# Patient Record
Sex: FEMALE | Race: WHITE
[De-identification: names, ages, dates, MRNs, and addresses within clinical notes are randomized per-mention and may not be internally consistent; named-entity substitution may affect disease eponyms.]

---

## 2017-07-22 ENCOUNTER — HOSPITAL ENCOUNTER (EMERGENCY)
Dept: HOSPITAL 61 - ER | Age: 77
Discharge: HOME | End: 2017-07-22
Payer: COMMERCIAL

## 2017-07-22 VITALS
SYSTOLIC BLOOD PRESSURE: 186 MMHG | DIASTOLIC BLOOD PRESSURE: 79 MMHG | SYSTOLIC BLOOD PRESSURE: 186 MMHG | SYSTOLIC BLOOD PRESSURE: 186 MMHG | DIASTOLIC BLOOD PRESSURE: 79 MMHG | DIASTOLIC BLOOD PRESSURE: 79 MMHG

## 2017-07-22 VITALS — HEIGHT: 63 IN | BODY MASS INDEX: 23.04 KG/M2 | WEIGHT: 130 LBS

## 2017-07-22 DIAGNOSIS — I10: ICD-10-CM
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
## 2017-07-22 DIAGNOSIS — S42.92XA: Primary | ICD-10-CM
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
## 2017-07-22 DIAGNOSIS — M54.2: ICD-10-CM

## 2017-07-22 DIAGNOSIS — I48.0: ICD-10-CM

## 2017-07-22 DIAGNOSIS — J44.9: ICD-10-CM

## 2017-07-22 DIAGNOSIS — Z88.8: ICD-10-CM

## 2017-07-22 DIAGNOSIS — S00.91XA: ICD-10-CM

## 2017-07-22 DIAGNOSIS — E11.9: ICD-10-CM

## 2017-07-22 LAB
ANION GAP SERPL CALC-SCNC: 5 MMOL/L (ref 6–14)
BUN SERPL-MCNC: 15 MG/DL (ref 7–20)
CALCIUM SERPL-MCNC: 10 MG/DL (ref 8.5–10.1)
CHLORIDE SERPL-SCNC: 107 MMOL/L (ref 98–107)
CO2 SERPL-SCNC: 32 MMOL/L (ref 21–32)
CREAT SERPL-MCNC: 0.8 MG/DL (ref 0.6–1)
GFR SERPLBLD BASED ON 1.73 SQ M-ARVRAT: 69.6 ML/MIN
GLUCOSE SERPL-MCNC: 169 MG/DL (ref 70–99)
HCT VFR BLD CALC: 38.7 % (ref 36–47)
HGB BLD-MCNC: 12.9 G/DL (ref 12–15.5)
INR PPP: 3.1 (ref 0.8–1.1)
PLATELET # BLD AUTO: 204 X10^3/UL (ref 140–400)
POTASSIUM SERPL-SCNC: 4.2 MMOL/L (ref 3.5–5.1)
PROTHROMBIN TIME: 29.7 SEC (ref 11.7–14)
SODIUM SERPL-SCNC: 144 MMOL/L (ref 136–145)
WBC # BLD AUTO: 8.3 X10^3/UL (ref 4–11)

## 2017-07-22 PROCEDURE — 85610 PROTHROMBIN TIME: CPT

## 2017-07-22 PROCEDURE — 36415 COLL VENOUS BLD VENIPUNCTURE: CPT

## 2017-07-22 PROCEDURE — 85027 COMPLETE CBC AUTOMATED: CPT

## 2017-07-22 PROCEDURE — 70450 CT HEAD/BRAIN W/O DYE: CPT

## 2017-07-22 PROCEDURE — 80048 BASIC METABOLIC PNL TOTAL CA: CPT

## 2017-07-22 PROCEDURE — 96374 THER/PROPH/DIAG INJ IV PUSH: CPT

## 2017-07-22 PROCEDURE — 96375 TX/PRO/DX INJ NEW DRUG ADDON: CPT

## 2017-07-22 PROCEDURE — 73060 X-RAY EXAM OF HUMERUS: CPT

## 2017-07-22 PROCEDURE — 73030 X-RAY EXAM OF SHOULDER: CPT

## 2017-07-22 PROCEDURE — 99285 EMERGENCY DEPT VISIT HI MDM: CPT

## 2017-07-22 PROCEDURE — 72125 CT NECK SPINE W/O DYE: CPT

## 2017-07-22 PROCEDURE — 96376 TX/PRO/DX INJ SAME DRUG ADON: CPT

## 2017-07-22 PROCEDURE — 71010: CPT

## 2017-07-22 NOTE — ED.ADGEN
Past Medical History


Past Medical History:  COPD, Diabetes-Type II, Hypertension


Past Surgical History:  No Surgical History


Alcohol Use:  None


Drug Use:  None





Adult General


Chief Complaint


Chief Complaint:  MECHANICAL FALL





HPI


HPI





Patient is a 76  year old with history of paroxysmal A. fib currently on 

Coumadin, COPD and hypertension who presents with mechanical fall while 

outdoors. Patient fell landing on her left arm and striking her head off of 

steps. Patient states she caught her foot on her steps while attempting to walk 

indoors. Denies loss of consciousness or headache. Patient has superficial 

abrasion over forehead. She reports left anterior shoulder/proximal humeral pain

, tenderness. There is no gross deformity or subluxation. Range of motion 

testing is limited due to patient pain. Patient reports posterior neck pain, 

denies chest pain, abdominal pain, hip pain or lower extremity pain. Patient 

denies any medical symptoms prior to fall. No other acute injuries or 

complaints.]





Review of Systems


Review of Systems


Review symptoms as per. All other review symptoms are negative.





Current Medications


Current Medications





Current Medications








 Medications


  (Trade)  Dose


 Ordered  Sig/[facility]  Start Time


 Stop Time Status Last Admin


Dose Admin


 


 Fentanyl Citrate


  (Fentanyl 2ml


 Vial)  50 mcg  1X  ONCE  7/22/17 16:45


 7/22/17 16:46 DC 7/22/17 16:41


50 MCG


 


 Ondansetron HCl


  (Zofran)  4 mg  1X  ONCE  7/22/17 18:15


 7/22/17 18:16 DC 7/22/17 18:12


4 MG











Allergies


Allergies





Allergies








Coded Allergies Type Severity Reaction Last Updated Verified


 


  phenazopyridine Allergy Intermediate  5/24/16 Yes











Physical Exam


Physical Exam





Constitutional: Well developed, well nourished, no acute distress, non-toxic 

appearance.


HENT: Normocephalic, atraumatic, bilateral external ears normal, oropharynx 

moist.


Eyes: PERRLA, EOMI.


Neck: Normal range of motion, midline upper cervical pain, no tenderness 

swelling or step-off.


Cardiovascular:Heart rate regular rhythm, no murmur.


Lungs & Thorax:  Bilateral breath sounds clear to auscultation, no subcutaneous 

emphysema, crepitus.


Abdomen: Bowel sounds normal, soft, no tenderness.


Skin: Warm, superficial skin tears without active bleeding.


Back: No midline spinal tenderness.


Extremities: Left shoulder/humerus pain, tenderness, range of motion testing 

not performed secondary to patient discomfort. No obvious deformity or 

dislocation. No posterior elbow, forearm or wrist pain or tenderness or 

deformity. No hip pain or tenderness.


Neurologic: Alert and oriented X 3, normal motor function, normal sensory 

function, no focal deficits noted.


Psychologic: Affect normal, judgement normal, mood normal.





Current Patient Data


Vital Signs





 Vital Signs








  Date Time  Temp Pulse Resp B/P (MAP) Pulse Ox O2 Delivery O2 Flow Rate FiO2


 


7/22/17 19:00  77 17 186/79 (114) 97 Room Air  


 


7/22/17 16:19 98.5       





 98.5       








Lab Values





 Laboratory Tests








Test


  7/22/17


16:28 7/22/17


17:00


 


Prothrombin Time


  29.7 SEC


(11.7-14.0)  H 


 


 


Prothrombin Time INR


  3.1 (0.8-1.1)


H 


 


 


White Blood Count


  


  8.3 x10^3/uL


(4.0-11.0)


 


Hemoglobin


  


  12.9 g/dL


(12.0-15.5)


 


Hematocrit


  


  38.7 %


(36.0-47.0)


 


Platelet Count


  


  204 x10^3/uL


(140-400)


 


Sodium Level


  


  144 mmol/L


(136-145)


 


Potassium Level


  


  4.2 mmol/L


(3.5-5.1)


 


Chloride Level


  


  107 mmol/L


()


 


Carbon Dioxide Level


  


  32 mmol/L


(21-32)


 


Anion Gap  5 (6-14)  L


 


Blood Urea Nitrogen


  


  15 mg/dL


(7-20)


 


Creatinine


  


  0.8 mg/dL


(0.6-1.0)


 


Estimated GFR


(Cockcroft-Gault) 


  69.6  


 


 


Glucose Level


  


  169 mg/dL


(70-99)  H


 


Calcium Level


  


  10.0 mg/dL


(8.5-10.1)





 Laboratory Tests


7/22/17 17:00








 Laboratory Tests


7/22/17 17:00














EKG


EKG


[]





Radiology/Procedures


Radiology/Procedures


[CT head/cervical spine: no acute intracranial or cervical spine injury.





Left shoulder/chest x-ray: Left humeral neck fracture, no obvious thoracic 

injury.]





Course & Med Decision Making


Course & Med Decision Making


Pertinent Labs and Imaging studies reviewed. (See chart for details)





[Patient with left shoulder fracture without neurovascular deficitmechanical 

fall. Patient placed in sling for comfort. Pain control. CT head/cervical spine

, chest x-ray does not show evidence of injury. Patient referred to PCP for 

further guidance regarding continuance of coumadin and on-call orthopedic 

surgeon. typical closed head injury instructions and return precautions 

reviewed.Patient and family members verbalized understanding and agreement 

discharge instructions prior to departure.]





Dragon Disclaimer


Dragon Disclaimer


This electronic medical record was generated, in whole or in part, using a 

voice recognition dictation system.











YOLANDE VELOZ DO Jul 22, 2017 17:32

## 2017-07-22 NOTE — RAD
CT Head W/O Contrast:

 

History: TRAUMA, FALL, HIT HEAD, NECK PAIN, NO PRIORS

 

Comparison: none

 

Axial images were obtained without contrast.

 

The gray and white matter appears normal and symmetrical for the patients 

age.  There is no mass effect, extraaxial fluid collections or 

hydrocephalus.  There is no gross bleed.  There is no focal loss of 

gray-white matter distinction to suggest acute ischemia, i.e. stroke.

 

Impression:  No acute findings.

 

 

End impression

 

 

CT C-Spine without contrast:

 

Clinical History: TRAUMA, FALL, HIT HEAD, NECK PAIN, NO PRIORS

 

Technique:  Axial helical images of the cervical spine were obtained 

without contrast, axial coronal and sagittal reconstruction was performed.

 

 

Findings:

 

There is no loss of vertebral body stature.  There is no prevertebral soft

tissue swelling.  The vertebral bodies are well aligned.  The C1-C2 

relationship is normal. The visualized osseous structures appear normal. 

Evaluation of the central canal is limited without contrast. There is 

multiple posterior disc bulges resulting in flattening of the thecal sac. 

There does not appear to be gross flattening of the cervical cord. There 

is facet arthropathy and a posterior lateral osteophytic ridge resulting 

in severe narrowing of the right neuroforamen at C3-C4. 

 

Impression:  

 

No acute findings.  Clinical correlation suggested.

 

PQRS Compliance Statement:

 

One or more of the following individualized dose reduction techniques were

utilized for this examination:  

1. Automated exposure control  

2. Adjustment of the mA and/or kV according to patient size  

3. Use of iterative reconstruction technique

 

Electronically signed by: Griffin Key III, MD (7/22/2017 6:11 PM) [facility]

## 2017-07-23 NOTE — RAD
Indication trauma. Fall. Left-sided pain.



A single view of the chest was obtained and is compared to an examination

5/24/2016.



Somewhat tortuous thoracic aorta is noted similar to the previous exam. There

is no consolidated pneumonia. Heart size is unchanged. There is no congestive

heart failure. There is no pneumothorax or significant pleural fluid. A

definite bony abnormality is not seen.



IMPRESSION: No acute or focal process is seen in the chest

## 2017-07-23 NOTE — RAD
Indication fall, pain.



AP and lateral views of the left humerus were obtained.



Known traumatic fracture involving the left humeral neck is noted. No

additional bony abnormality is seen.



IMPRESSION: Fractured left humeral neck

## 2019-03-13 ENCOUNTER — HOSPITAL ENCOUNTER (OUTPATIENT)
Dept: HOSPITAL 61 - NM | Age: 79
Discharge: HOME | End: 2019-03-13
Attending: INTERNAL MEDICINE
Payer: COMMERCIAL

## 2019-03-13 DIAGNOSIS — I49.3: ICD-10-CM

## 2019-03-13 DIAGNOSIS — J44.9: ICD-10-CM

## 2019-03-13 DIAGNOSIS — I50.9: ICD-10-CM

## 2019-03-13 DIAGNOSIS — I11.0: ICD-10-CM

## 2019-03-13 DIAGNOSIS — R00.8: ICD-10-CM

## 2019-03-13 DIAGNOSIS — I48.0: Primary | ICD-10-CM

## 2019-03-13 PROCEDURE — 96374 THER/PROPH/DIAG INJ IV PUSH: CPT

## 2019-03-13 PROCEDURE — 93017 CV STRESS TEST TRACING ONLY: CPT

## 2019-03-13 PROCEDURE — A9500 TC99M SESTAMIBI: HCPCS

## 2019-03-13 PROCEDURE — 93306 TTE W/DOPPLER COMPLETE: CPT

## 2019-03-13 PROCEDURE — 78452 HT MUSCLE IMAGE SPECT MULT: CPT

## 2019-03-13 PROCEDURE — 96376 TX/PRO/DX INJ SAME DRUG ADON: CPT

## 2019-03-14 NOTE — RAD
MR#: [id number]

Account#: [id number]

Accession#: [id number]

Date of Study: 03/14/2019

Ordering Physician: LAINA FELIX, 

Referring Physician: SHANNAN PFEIFFER Tech: RT Marco Antonio (THALIA) (N)





--------------- APPROVED REPORT --------------





Test Type:          Pharmacological

Stress Nurse/Tech: KECIA Herrera RN

Test Indications: paraxysmal afib

Cardiac History: a fib, CHF, HTN

Medical History: COPD

Resting ECG:     Afib; baseline noted slight ST depression in leads V2, V3, 

SR with PAC's

Resting Heart Rate: 71 bpm

Pretest Chest Pain: No chest pain



Nurse/Tech Notes

Irregular heart tones. Lungs sound clear.

Consent: The procedure was explained to the patient in lay terms. Informed consent was witnessed. Manolo
eout was entered into [employer]. History and Stress Test performed by GRANT Strange



Pharm. Details

Pharmacologic stress testing was performed using 0.4mg per 5ml of regadenoson given intravenously ove
r 7-10 seconds.



Stress Symptoms

Pt c/o chest pressure 5/10 in stage R at 00:55; decreased 3/10 at stage R at 01:55; resolved by stage
 R at 04:55. Pt also c/o HA and feeling flushed, and SOA.



POST EXERCISE

Reason for Termination: Infusion complete

Max HR: 87 bpm

Max Blood Pressure: 161/56mmHg

Blood Pressure response to exercise: Normal blood pressure response during stress.

Heart Rate response to exercise: normal

Chest Pain: Yes. see stress symptoms above

Arrhythmia: No. 1 PVC noted

ST Change: Yes. ST depression and elevation slight increase from baseline



INTERPRETATION

Stress EKG Conclusion: Baseline EKG showed sinus rhythm with PAC's.  Non-diagnostic changes at peak s
tress.  No arrhythmias.



Imaging Protocol

IMAGE PROTOCOL: Rest Tc-99m/stress Tc-99m 2 days



Rest:            Stress:         Viability:   

Radiopharm.Tc99m NdpgdvvphWp71v Sestamibi

Xaos76bVv            33mCi            

Duration    15min.           15min.           

Img Date  03/13/2019 03/14/2019      

Inj-Img Yuqt77cys.           60min.           



Rest Admin Site:IV - Right ForearmAdministrator:ANUSHKA Snider, ARRT (R)(N)

Stress Admin Site: : Rashawn Fernandez [facility]



STRESS DATA

End Diast. Vol.143.0mlLVEDV index BSA70.0ml

End Syst. Vol.64.0mlLVESV index BSA32.0ml

Myocardial Asee922.0gEject. Ojhwzvow78.0%



Stress Scores

Regional WT2.00Summed WT18.00

Regional WM0.00Summed WM5.00



Study quality was good.  Left Ventricular size was Normal at Rest and Stress.

Lung uptake was .  Left Ventricular ejection fraction is 55%.

The rest and stress images show normal perfusion, normal contraction and thickening.



LV Perf. Quant

17 Seg. SSS3.00

17 Seg. SRS0.00

17 Seg. SDS3.00

Stress Defect Extent (% LAD)0.00Rest Defect Extent (% LAD)0.00Rev. Defect Extent (% LAD)0.00

Stress Defect Extent (% LCX) 2.50Rest Defect Extent (% LCX)0.00Rev. Defect Extent (% LCX)2.50

Stress Defect Extent (% RCA)14.40Rest Defect Extent (% RCA)0.00Rev. Defect Extent (% RCA)12.20

Stress Defect Extent (% KRAIG)5.20Rest Defect Extent (% KRAIG)0.00Rev. Defect Extent (% KRAIG)4.80



Conclusion

1. Regadenoson cardioisotope stress test did not show any evidence of ischemia or infarct.

2. Normal left ventricular systolic function with ejection fraction calculated at 55%.

3. Low risk for cardiac events.



Signed by : Laina Felix, 

Electronically Approved : 03/14/2019 12:21:45

## 2019-03-14 NOTE — CARD
MR#: [id number]

Account#: [id number]

Accession#: [id number]

Date of Study: 03/14/2019

Ordering Physician: LAINA HOLBROOK, 

Referring Physician: LAINA HOLBROOK, 

Tech: Marian Andrade





--------------- APPROVED REPORT --------------





EXAM: Two-dimensional and M-mode echocardiogram with Doppler and color Doppler.



Other Information 

Quality : AverageHR: 75bpm



INDICATION

Atrial Fibrillation



2D DIMENSIONS 

RVDd4.1 (2.9-3.5cm)Left Atrium(2D)4.7 (1.6-4.0cm)

IVSd1.3 (0.7-1.1cm)Aortic Root(2D)3.4 (2.0-3.7cm)

LVDd4.7 (3.9-5.9cm)LVOT Diameter2.2 (1.8-2.4cm)

PWd1.3 (0.7-1.1cm)LVDs2.9 (2.5-4.0cm)

FS (%) 39.0 %SV71.8 ml

LVEF(%)69.4 (>50%)



Aortic Valve

AoV Peak Glen.132.2cm/sAoV VTI28.9cm

AO Peak GR.7.0mmHgLVOT Peak Glen.73.3cm/s

LVOT  VTI 15.20cmAO Mean GR.4mmHg

GABRIELA (VMAX)1.26nt5MKC   (VTI)2.04cm2



Mitral Valve

MV E Cxlyjkke66.1cm/sMV DECEL [id number]

MV A Obhzgdrk31.3cm/sMV HLL46bm

E/A  Ratio0.6MVA (PHT)4.99cm2



TDI

E/Lateral E'5.6E/Medial E'7.7



Pulmonary Valve

PV Peak Zfgdytwa940.2cm/sPV Peak Grad.7mmHg



Tricuspid Valve

TR P. Iavmdlsf908kl/sRAP EBYPGWFL4ybUt

TR Peak Gr.99txRrUHBY17zvTb



Pulmonary Vein

S1 Vcghxasx24.6cm/sD2 Vdkcsgyb67.3cm/s

PVa twngzzkb610utnk



 LEFT VENTRICLE 

The left ventricle is normal size. There is moderate concentric left ventricular hypertrophy. The lef
t ventricular systolic function is normal. The Ejection Fraction is 60-65%. There is normal LV segmen
phan wall motion. Transmitral Doppler flow pattern is Grade I-abnormal relaxation pattern.



 RIGHT VENTRICLE 

The right ventricle is normal size. There is normal right ventricular wall thickness. The right ventr
icular systolic function is normal.



 ATRIA 

The left atrium size is normal. The right atrium is mildly dilated. 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



 AORTIC VALVE 

The aortic valve is thickened but opens well. Doppler and Color Flow revealed no significant aortic r
egurgitation. There is no significant aortic valvular stenosis.



 MITRAL VALVE 

The mitral valve is normal in structure and function. There is no evidence of mitral valve prolapse. 
There is no mitral valve stenosis. Doppler and Color-flow revealed trace mitral regurgitation.



 TRICUSPID VALVE 

The tricuspid valve is normal in structure and function. Doppler and Color Flow revealed trace to mil
d tricuspid regurgitation. There is no tricuspid valve stenosis.



 PULMONIC VALVE 

The pulmonary valve is normal in structure and function. Doppler and Color Flow revealed trace to mil
d pulmonic valvular regurgitation.



 GREAT VESSELS 

The aortic root is normal in size. The IVC is normal in size and collapses >50% with inspiration.



 PERICARDIAL EFFUSION 

There is no evidence of significant pericardial effusion.



Critical Notification

Critical Value: No



<Conclusion>

The left ventricular systolic function is normal.

The Ejection Fraction is 60-65%.

There is normal LV segmental wall motion.

Transmitral Doppler flow pattern is Grade I-abnormal relaxation pattern.

Trace mitral regurgitation.

Trace to mild tricuspid regurgitation.

There is no evidence of significant pericardial effusion.



Signed by : Laina Holbrook, 

Electronically Approved : 03/14/2019 11:46:30

## 2019-10-22 ENCOUNTER — HOSPITAL ENCOUNTER (OUTPATIENT)
Dept: HOSPITAL 61 - US | Age: 79
Discharge: HOME | End: 2019-10-22
Attending: INTERNAL MEDICINE
Payer: COMMERCIAL

## 2019-10-22 DIAGNOSIS — R60.0: Primary | ICD-10-CM

## 2019-10-22 PROCEDURE — 93970 EXTREMITY STUDY: CPT

## 2019-10-22 NOTE — RAD
MR#: [id number]

Account#: [id number]

Accession#: [id number]

Date of Study: 10/22/2019

Ordering Physician: LAINA FELIX, 

Referring Physician: LAINA FELIX, 

Tech: Shade Borrero MBA, RDMS, RVT, RDCS, RTR





--------------- APPROVED REPORT --------------





Patient Location : OUT-PATIENT



Indications

Lower Extremity Edema :     Bilateral



Findings

Technically difficult study.



The right great saphenous vein measures 4.5 mm in the left great saphenous vein measures 8 mm. Based 
on color Doppler and spectral images no obvious evidence of reflux is noted. The bilateral lesser sap
henous veins do not show any evidence of reflux.



Critical Notification

Critical Value: No



<Conclusion>

No significant reflux noted in the bilateral greater and lesser saphenous veins



Signed by : Ron Sorto, 

Electronically Approved : 10/22/2019 11:03:20

## 2019-12-02 NOTE — RAD
Indication pain secondary to a fall.



Internally and externally rotated views of both shoulders as well as a Y view

were obtained.



Views of the right shoulder demonstrate minimal degenerative change at the AC

joint. A fracture or acute finding is not apparent.



Views of the left shoulder demonstrate a mildly impacted, traumatic, fracture

of the humeral neck.



IMPRESSION: No acute finding seen on the right.

                          Mildly impacted left humeral neck fracture Adequate: hears normal conversation without difficulty

## 2019-12-31 ENCOUNTER — HOSPITAL ENCOUNTER (INPATIENT)
Dept: HOSPITAL 61 - ER | Age: 79
LOS: 3 days | Discharge: HOME | DRG: 309 | End: 2020-01-03
Attending: INTERNAL MEDICINE | Admitting: INTERNAL MEDICINE
Payer: MEDICARE

## 2019-12-31 VITALS — HEIGHT: 63 IN | WEIGHT: 234.06 LBS | BODY MASS INDEX: 41.47 KG/M2

## 2019-12-31 DIAGNOSIS — R07.89: ICD-10-CM

## 2019-12-31 DIAGNOSIS — I48.0: Primary | ICD-10-CM

## 2019-12-31 DIAGNOSIS — I50.9: ICD-10-CM

## 2019-12-31 DIAGNOSIS — Z86.718: ICD-10-CM

## 2019-12-31 DIAGNOSIS — I16.0: ICD-10-CM

## 2019-12-31 DIAGNOSIS — Z88.8: ICD-10-CM

## 2019-12-31 DIAGNOSIS — Z86.711: ICD-10-CM

## 2019-12-31 DIAGNOSIS — E66.3: ICD-10-CM

## 2019-12-31 DIAGNOSIS — I11.0: ICD-10-CM

## 2019-12-31 DIAGNOSIS — Z79.01: ICD-10-CM

## 2019-12-31 DIAGNOSIS — J44.9: ICD-10-CM

## 2019-12-31 DIAGNOSIS — Z82.49: ICD-10-CM

## 2019-12-31 DIAGNOSIS — E11.9: ICD-10-CM

## 2019-12-31 LAB
ALBUMIN SERPL-MCNC: 3 G/DL (ref 3.4–5)
ALBUMIN/GLOB SERPL: 0.8 {RATIO} (ref 1–1.7)
ALP SERPL-CCNC: 98 U/L (ref 46–116)
ALT SERPL-CCNC: 10 U/L (ref 14–59)
ANION GAP SERPL CALC-SCNC: 3 MMOL/L (ref 6–14)
AST SERPL-CCNC: 12 U/L (ref 15–37)
BASOPHILS # BLD AUTO: 0.1 X10^3/UL (ref 0–0.2)
BASOPHILS NFR BLD: 1 % (ref 0–3)
BILIRUB SERPL-MCNC: 0.7 MG/DL (ref 0.2–1)
BUN SERPL-MCNC: 11 MG/DL (ref 7–20)
BUN/CREAT SERPL: 16 (ref 6–20)
CALCIUM SERPL-MCNC: 10.3 MG/DL (ref 8.5–10.1)
CHLORIDE SERPL-SCNC: 105 MMOL/L (ref 98–107)
CO2 SERPL-SCNC: 33 MMOL/L (ref 21–32)
CREAT SERPL-MCNC: 0.7 MG/DL (ref 0.6–1)
EOSINOPHIL NFR BLD: 0.2 X10^3/UL (ref 0–0.7)
EOSINOPHIL NFR BLD: 3 % (ref 0–3)
ERYTHROCYTE [DISTWIDTH] IN BLOOD BY AUTOMATED COUNT: 15.3 % (ref 11.5–14.5)
GFR SERPLBLD BASED ON 1.73 SQ M-ARVRAT: 80.5 ML/MIN
GLOBULIN SER-MCNC: 3.9 G/DL (ref 2.2–3.8)
GLUCOSE SERPL-MCNC: 141 MG/DL (ref 70–99)
HCT VFR BLD CALC: 39.7 % (ref 36–47)
HGB BLD-MCNC: 13 G/DL (ref 12–15.5)
LYMPHOCYTES # BLD: 1.3 X10^3/UL (ref 1–4.8)
LYMPHOCYTES NFR BLD AUTO: 20 % (ref 24–48)
MAGNESIUM SERPL-MCNC: 1.9 MG/DL (ref 1.8–2.4)
MCH RBC QN AUTO: 31 PG (ref 25–35)
MCHC RBC AUTO-ENTMCNC: 33 G/DL (ref 31–37)
MCV RBC AUTO: 93 FL (ref 79–100)
MONO #: 0.7 X10^3/UL (ref 0–1.1)
MONOCYTES NFR BLD: 11 % (ref 0–9)
NEUT #: 4 X10^3/UL (ref 1.8–7.7)
NEUTROPHILS NFR BLD AUTO: 65 % (ref 31–73)
PLATELET # BLD AUTO: 214 X10^3/UL (ref 140–400)
POTASSIUM SERPL-SCNC: 4 MMOL/L (ref 3.5–5.1)
PROT SERPL-MCNC: 6.9 G/DL (ref 6.4–8.2)
PROTHROMBIN TIME: 23.7 SEC (ref 11.7–14)
RBC # BLD AUTO: 4.26 X10^6/UL (ref 3.5–5.4)
SODIUM SERPL-SCNC: 141 MMOL/L (ref 136–145)
WBC # BLD AUTO: 6.3 X10^3/UL (ref 4–11)

## 2019-12-31 PROCEDURE — 85610 PROTHROMBIN TIME: CPT

## 2019-12-31 PROCEDURE — 80053 COMPREHEN METABOLIC PANEL: CPT

## 2019-12-31 PROCEDURE — 83880 ASSAY OF NATRIURETIC PEPTIDE: CPT

## 2019-12-31 PROCEDURE — 71045 X-RAY EXAM CHEST 1 VIEW: CPT

## 2019-12-31 PROCEDURE — 80061 LIPID PANEL: CPT

## 2019-12-31 PROCEDURE — 84484 ASSAY OF TROPONIN QUANT: CPT

## 2019-12-31 PROCEDURE — 93306 TTE W/DOPPLER COMPLETE: CPT

## 2019-12-31 PROCEDURE — 36415 COLL VENOUS BLD VENIPUNCTURE: CPT

## 2019-12-31 PROCEDURE — G0378 HOSPITAL OBSERVATION PER HR: HCPCS

## 2019-12-31 PROCEDURE — 93005 ELECTROCARDIOGRAM TRACING: CPT

## 2019-12-31 PROCEDURE — 80048 BASIC METABOLIC PNL TOTAL CA: CPT

## 2019-12-31 PROCEDURE — 85025 COMPLETE CBC W/AUTO DIFF WBC: CPT

## 2019-12-31 PROCEDURE — 83735 ASSAY OF MAGNESIUM: CPT

## 2019-12-31 RX ADMIN — NITROGLYCERIN PRN MG: 0.4 TABLET SUBLINGUAL at 21:12

## 2019-12-31 RX ADMIN — NITROGLYCERIN PRN MG: 0.4 TABLET SUBLINGUAL at 21:20

## 2019-12-31 RX ADMIN — NITROGLYCERIN PRN MG: 0.4 TABLET SUBLINGUAL at 21:10

## 2019-12-31 NOTE — PHYS DOC
Past Medical History


Past Medical History:  A-Fib, COPD, Diabetes-Type II, DVT, Hypertension


Past Surgical History:  No Surgical History


Alcohol Use:  None


Drug Use:  None





Adult General


Chief Complaint


Chief Complaint:  CHEST PAIN





HPI


HPI





79-year-old female presents to the emergency department with complaints of chest

pressure, shortness of breath. She states this started this afternoon and just 

has not went away. Patient's underlying history of atrial fibrillation, DVT, 

COPD, hypertension, diabetes. She is on chronic anticoagulation. He makes her 

pain worse, nothing makes her pain better. Attempted nitroglycerin in the 

emergency department however has had 3 without improvement. Morphine 2 mg IVx 1 

in ER





Review of Systems


Review of Systems





Constitutional: Denies fever or chills []


Respiratory: Denies cough or shortness of breath []


Cardiovascular: No additional information not addressed in HPI []


GI: Denies abdominal pain, nausea, vomiting, bloody stools or diarrhea []


: Denies dysuria or hematuria []


Musculoskeletal: Denies back pain or joint pain []


Neurologic: Denies headache, focal weakness or sensory changes []





All other systems were reviewed and found to be within normal limits, except as 

documented in this note.





Current Medications


Current Medications





Current Medications








 Medications


  (Trade)  Dose


 Ordered  Sig/[facility]  Start Time


 Stop Time Status Last Admin


Dose Admin


 


 Aspirin


  ([facility]


 Aspirin)  324 mg  1X  ONCE  12/31/19 20:30


 12/31/19 20:33 DC 12/31/19 21:01


324 MG


 


 Morphine Sulfate


  (Morphine


 Sulfate)  2 mg  1X  ONCE  12/31/19 22:30


 12/31/19 22:31 DC  





 


 Nitroglycerin


  (Nitrostat)  0.4 mg  PRN Q5MIN  PRN  12/31/19 20:30


 1/1/20 20:29  12/31/19 21:20


0.4 MG











Allergies


Allergies





Allergies








Coded Allergies Type Severity Reaction Last Updated Verified


 


  phenazopyridine Allergy Intermediate  5/24/16 Yes











Physical Exam


Physical Exam





Constitutional: Well developed, well nourished, no acute distress, non-toxic ap

pearance. []


HENT: Normocephalic, atraumatic, bilateral external ears normal, oropharynx 

moist, no oral exudates, nose normal. []


Eyes: PERRLA, EOMI, conjunctiva normal, no discharge. [] [] 


Cardiovascular:Heart rate regular rhythm, no murmur []


Lungs & Thorax:  Bilateral breath sounds clear to auscultation []


Abdomen: Bowel sounds normal, soft, no tenderness, no masses, no pulsatile 

masses. [] 


Skin: Warm, dry, no erythema, no rash. [] 


Extremities: No tenderness, no edema. [] 


Neurologic: Alert and oriented X 3, no focal deficits noted. []


Psychologic: Affect normal, judgement normal, mood normal. []





Current Patient Data


Vital Signs





                                   Vital Signs








  Date Time  Temp Pulse Resp B/P (MAP) Pulse Ox O2 Delivery O2 Flow Rate FiO2


 


12/31/19 21:20  74  185/82    


 


12/31/19 20:41 97.8  22  92 Room Air  





 97.8       








Lab Values





                                Laboratory Tests








Test


 12/31/19


21:30


 


White Blood Count


 6.3 x10^3/uL


(4.0-11.0)


 


Red Blood Count


 4.26 x10^6/uL


(3.50-5.40)


 


Hemoglobin


 13.0 g/dL


(12.0-15.5)


 


Hematocrit


 39.7 %


(36.0-47.0)


 


Mean Corpuscular Volume


 93 fL ()





 


Mean Corpuscular Hemoglobin 31 pg (25-35)  


 


Mean Corpuscular Hemoglobin


Concent 33 g/dL


(31-37)


 


Red Cell Distribution Width


 15.3 %


(11.5-14.5)  H


 


Platelet Count


 214 x10^3/uL


(140-400)


 


Neutrophils (%) (Auto) 65 % (31-73)  


 


Lymphocytes (%) (Auto) 20 % (24-48)  L


 


Monocytes (%) (Auto) 11 % (0-9)  H


 


Eosinophils (%) (Auto) 3 % (0-3)  


 


Basophils (%) (Auto) 1 % (0-3)  


 


Neutrophils # (Auto)


 4.0 x10^3/uL


(1.8-7.7)


 


Lymphocytes # (Auto)


 1.3 x10^3/uL


(1.0-4.8)


 


Monocytes # (Auto)


 0.7 x10^3/uL


(0.0-1.1)


 


Eosinophils # (Auto)


 0.2 x10^3/uL


(0.0-0.7)


 


Basophils # (Auto)


 0.1 x10^3/uL


(0.0-0.2)


 


Prothrombin Time


 23.7 SEC


(11.7-14.0)  H


 


Prothrombin Time INR


 2.1 (0.8-1.1)


H


 


Sodium Level


 141 mmol/L


(136-145)


 


Potassium Level


 4.0 mmol/L


(3.5-5.1)


 


Chloride Level


 105 mmol/L


()


 


Carbon Dioxide Level


 33 mmol/L


(21-32)  H


 


Anion Gap 3 (6-14)  L


 


Blood Urea Nitrogen


 11 mg/dL


(7-20)


 


Creatinine


 0.7 mg/dL


(0.6-1.0)


 


Estimated GFR


(Cockcroft-Gault) 80.5  





 


BUN/Creatinine Ratio 16 (6-20)  


 


Glucose Level


 141 mg/dL


(70-99)  H


 


Calcium Level


 10.3 mg/dL


(8.5-10.1)  H


 


Magnesium Level Pending  


 


Total Bilirubin Pending  


 


Aspartate Amino Transferase


(AST) Pending  





 


Alanine Aminotransferase (ALT) Pending  


 


Alkaline Phosphatase Pending  


 


Troponin I Quantitative


 < 0.017 ng/mL


(0.000-0.055)


 


NT-Pro-B-Type Natriuretic


Peptide 492 pg/mL


(0-449)  H


 


Total Protein Pending  


 


Albumin Pending  


 


Albumin/Globulin Ratio Pending  





                                Laboratory Tests


12/31/19 21:30








                                Laboratory Tests


12/31/19 21:30











EKG


EKG


EKG reviewed, 2033 - no stemi, NSR, 76, LAD[]





Radiology/Procedures


Radiology/Procedures


[]





Course & Med Decision Making


Course & Med Decision Making


Pertinent Labs and Imaging studies reviewed. (See chart for details)





[]79-year-old female presents to the emergency department with complaints of 

chest pressure, shortness of breath. She states this started this afternoon and 

just has not went away. Patient's underlying history of atrial fibrillation, 

DVT, COPD, hypertension, diabetes. She is on chronic anticoagulation. He makes 

her pain worse, nothing makes her pain better. Attempted nitroglycerin in the 

emergency department however has had 3 without improvement. Morphine 2 mg IVx 1 

in ER





Labs/Imaging reviewed


Trop within normal limits


EKG reviewed, no acute findings





Patietn provided with NTG x 3 without relief, Morphine 2mg IV x1


Reassessment - pain improved


HEART Score 6


Recommend observation, trend cardiac enzymes





Dragon Disclaimer


Dragon Disclaimer


This electronic medical record was generated, in whole or in part, using a voice

 recognition dictation system.





The HEART Score for CP Pts


HEART Score for Chest Pain:  








HEART Score for Chest Pain Response (Comments) Value


 


History Moderately Suspicious 1


 


ECG Nonspecific Repolarizatio 1


 


Age > 65 2


 


Risk Factors >3 Risk Factors or Hx CAD 2


 


Troponin < Normal Limit 0


 


Total  6








Risk Factors:


Risk Factors:  DM, Current or recent (<one month) smoker, HTN, HLP, family 

history of CAD, obesity.


Risk Scores:


Score 0 - 3:  2.5% MACE over next 6 weeks - Discharge Home


Score 4 - 6:  20.3% MACE over next 6 weeks - Admit for Clinical Observation


Score 7 - 10:  72.7% MACE over next 6 weeks - Early Invasive Strategies





Departure


Departure


Impression:  


   Primary Impression:  


   Chest pain


   Additional Impressions:  


   Hypertension


   Diabetes


Disposition:  09 ADMITTED AS INPATIENT


Admitting Physician:  HIMS


Condition:  STABLE


Referrals:  


CHANDA DIXON MD (PCP)





Problem Qualifiers








   Primary Impression:  


   Chest pain


   Chest pain type:  unspecified  Qualified Codes:  R07.9 - Chest pain, 

   unspecified


   Additional Impressions:  


   Hypertension


   Hypertension type:  essential hypertension  Qualified Codes:  I10 - Essential

    (primary) hypertension


   Diabetes


   Diabetes mellitus type:  other specified (including EZEQUIEL)  Diabetes mellitus 

   long term insulin use:  unspecified long term insulin use status  Diabetes 

   mellitus complication status:  without complication  Qualified Codes:  E13.9 

   - Other specified diabetes mellitus without complications








DELROY GONZALES MD              Dec 31, 2019 21:58

## 2020-01-01 VITALS — DIASTOLIC BLOOD PRESSURE: 69 MMHG | SYSTOLIC BLOOD PRESSURE: 168 MMHG

## 2020-01-01 VITALS — DIASTOLIC BLOOD PRESSURE: 77 MMHG | SYSTOLIC BLOOD PRESSURE: 176 MMHG

## 2020-01-01 VITALS — DIASTOLIC BLOOD PRESSURE: 45 MMHG | SYSTOLIC BLOOD PRESSURE: 154 MMHG

## 2020-01-01 VITALS — SYSTOLIC BLOOD PRESSURE: 127 MMHG | DIASTOLIC BLOOD PRESSURE: 58 MMHG

## 2020-01-01 VITALS — DIASTOLIC BLOOD PRESSURE: 62 MMHG | SYSTOLIC BLOOD PRESSURE: 158 MMHG

## 2020-01-01 VITALS — SYSTOLIC BLOOD PRESSURE: 161 MMHG | DIASTOLIC BLOOD PRESSURE: 61 MMHG

## 2020-01-01 VITALS — DIASTOLIC BLOOD PRESSURE: 41 MMHG | SYSTOLIC BLOOD PRESSURE: 132 MMHG

## 2020-01-01 VITALS — DIASTOLIC BLOOD PRESSURE: 79 MMHG | SYSTOLIC BLOOD PRESSURE: 172 MMHG

## 2020-01-01 LAB
ALBUMIN SERPL-MCNC: 2.8 G/DL (ref 3.4–5)
ALBUMIN/GLOB SERPL: 0.8 {RATIO} (ref 1–1.7)
ALP SERPL-CCNC: 87 U/L (ref 46–116)
ALT SERPL-CCNC: 8 U/L (ref 14–59)
ANION GAP SERPL CALC-SCNC: 2 MMOL/L (ref 6–14)
AST SERPL-CCNC: 13 U/L (ref 15–37)
BASOPHILS # BLD AUTO: 0 X10^3/UL (ref 0–0.2)
BASOPHILS NFR BLD: 0 % (ref 0–3)
BILIRUB SERPL-MCNC: 0.8 MG/DL (ref 0.2–1)
BUN SERPL-MCNC: 10 MG/DL (ref 7–20)
BUN/CREAT SERPL: 14 (ref 6–20)
CALCIUM SERPL-MCNC: 10 MG/DL (ref 8.5–10.1)
CHLORIDE SERPL-SCNC: 106 MMOL/L (ref 98–107)
CO2 SERPL-SCNC: 33 MMOL/L (ref 21–32)
CREAT SERPL-MCNC: 0.7 MG/DL (ref 0.6–1)
EOSINOPHIL NFR BLD: 0.2 X10^3/UL (ref 0–0.7)
EOSINOPHIL NFR BLD: 4 % (ref 0–3)
ERYTHROCYTE [DISTWIDTH] IN BLOOD BY AUTOMATED COUNT: 14.7 % (ref 11.5–14.5)
GFR SERPLBLD BASED ON 1.73 SQ M-ARVRAT: 80.5 ML/MIN
GLOBULIN SER-MCNC: 3.5 G/DL (ref 2.2–3.8)
GLUCOSE SERPL-MCNC: 134 MG/DL (ref 70–99)
HCT VFR BLD CALC: 37.5 % (ref 36–47)
HGB BLD-MCNC: 12.5 G/DL (ref 12–15.5)
LYMPHOCYTES # BLD: 1.1 X10^3/UL (ref 1–4.8)
LYMPHOCYTES NFR BLD AUTO: 21 % (ref 24–48)
MCH RBC QN AUTO: 30 PG (ref 25–35)
MCHC RBC AUTO-ENTMCNC: 33 G/DL (ref 31–37)
MCV RBC AUTO: 91 FL (ref 79–100)
MONO #: 0.7 X10^3/UL (ref 0–1.1)
MONOCYTES NFR BLD: 12 % (ref 0–9)
NEUT #: 3.5 X10^3/UL (ref 1.8–7.7)
NEUTROPHILS NFR BLD AUTO: 63 % (ref 31–73)
PLATELET # BLD AUTO: 191 X10^3/UL (ref 140–400)
POTASSIUM SERPL-SCNC: 4 MMOL/L (ref 3.5–5.1)
PROT SERPL-MCNC: 6.3 G/DL (ref 6.4–8.2)
RBC # BLD AUTO: 4.1 X10^6/UL (ref 3.5–5.4)
SODIUM SERPL-SCNC: 141 MMOL/L (ref 136–145)
WBC # BLD AUTO: 5.5 X10^3/UL (ref 4–11)

## 2020-01-01 RX ADMIN — POTASSIUM CHLORIDE SCH MEQ: 750 TABLET, FILM COATED, EXTENDED RELEASE ORAL at 13:03

## 2020-01-01 RX ADMIN — LOSARTAN POTASSIUM SCH MG: 50 TABLET ORAL at 13:04

## 2020-01-01 RX ADMIN — FUROSEMIDE SCH MG: 40 TABLET ORAL at 13:03

## 2020-01-01 RX ADMIN — CALCIUM CARBONATE-VITAMIN D TAB 500 MG-200 UNIT SCH TAB: 500-200 TAB at 13:03

## 2020-01-01 RX ADMIN — Medication PRN EACH: at 16:58

## 2020-01-01 RX ADMIN — WARFARIN SODIUM SCH MG: 3 TABLET ORAL at 16:14

## 2020-01-01 RX ADMIN — SIMVASTATIN SCH MG: 10 TABLET, FILM COATED ORAL at 20:07

## 2020-01-01 NOTE — RAD
PORTABLE CHEST 1V

 

History: Chest pain.

 

Comparison with 7/22/2017. Images available but no report from that study.

 

The cardiomediastinal silhouette appears slightly wider than at that time.

Aorta is tortuous and calcified, and ectatic. The overall morphology is 

similar.

 

No evidence of pneumothorax. No pleural effusion. Diffuse interstitial 

opacities in both lungs, grossly similar to prior study, likely due to 

chronic fibrosis. No focal airspace consolidation is seen. No acute bone 

process is identified.

 

IMPRESSION:

1. Mild enlargement of the cardiac silhouette.

2. Aortic tortuosity and ectasia.

3. Interstitial opacities are likely chronic fibrosis.

4. No consolidating infiltrate.

 

Electronically signed by: Jono Frost MD (1/1/2020 7:56 AM) [facility]

## 2020-01-01 NOTE — PDOC1
History and Physical


Date of Admission:


Date of Admission


DATE: 1/1/20 


TIME: 12:57





Chief Complaint:


Problems:  


(1) UTI (urinary tract infection)


(2) Diabetes


(3) Chest pain


(4) Hypertension


(5) Afib


Chief Complain:


Chest pain





History of Present Illness:


HPI:


This is a pleasant elderly female who presented to the ER with chest pain with 

associated shortness of breath


 She states this started this afternoon and just has not went away. Patient's 

underlying history of atrial fibrillation, DVT, COPD, hypertension, diabetes. 

She is on chronic anticoagulation. He makes her pain worse, nothing makes her 

pain better. Attempted nitroglycerin in the emergency department however has had

3 without improvement. Morphine 2 mg IVx 1 in ER





Past Medical/Surgical History:


PMH/PSH:


Past Medical History:  A-Fib, COPD, Diabetes-Type II, DVT, Hypertension


Past Surgical History: Pericardial window


Alcohol Use:  None


Drug Use:  None





Allergies:


Allergies:  


Coded Allergies:  


     phenazopyridine (Verified  Allergy, Intermediate, 5/24/16)





Family History:


Family History:


Coronary disease





Social History:


Social Hisoty:


She doesn't drink smoke or take drugs she is retired





Current Medications:


Current Medications





Current Medications


Aspirin (Children'S Aspirin) 324 mg 1X  ONCE PO  Last administered on 12/31/19at

21:01;  Start 12/31/19 at 20:30;  Stop 12/31/19 at 20:33;  Status DC


Nitroglycerin (Nitrostat) 0.4 mg PRN Q5MIN  PRN SL CP RATING > 1/10 Last 

administered on 12/31/19at 21:20;  Start 12/31/19 at 20:30;  Stop 12/31/19 at 

23:22;  Status DC


Morphine Sulfate (Morphine Sulfate) 2 mg 1X  ONCE IV ;  Start 12/31/19 at 22:30;

 Stop 12/31/19 at 22:31;  Status DC


Ondansetron HCl (Zofran) 4 mg PRN Q8HRS  PRN IV NAUSEA/VOMITING 1ST CHOICE;  

Start 12/31/19 at 23:30;  Stop 1/1/20 at 23:29


Morphine Sulfate (Morphine Sulfate) 2 mg PRN Q2HR  PRN IV SEVERE PAIN 7-10;  

Start 12/31/19 at 23:30;  Stop 1/1/20 at 23:29


Acetaminophen (Tylenol) 650 mg PRN Q4HRS  PRN PO FEVER;  Start 12/31/19 at 

23:30;  Stop 1/1/20 at 23:29


Nitroglycerin (Nitrostat) 0.4 mg PRN Q5MIN  PRN SL CHEST PAIN;  Start 12/31/19 

at 23:30;  Stop 1/1/20 at 23:29


Furosemide (Lasix) 40 mg DAILY PO ;  Start 1/1/20 at 12:00


Losartan Potassium (Cozaar) 25 mg DAILY PO ;  Start 1/1/20 at 12:00


Potassium Chloride (Klor-Con) 10 meq DAILY PO ;  Start 1/1/20 at 12:00


Simvastatin (Zocor) 10 mg QHS PO ;  Start 1/1/20 at 21:00


Non-Formulary Medication (Alendronate Sodium ) 1 tab WEEKLY PO ;  Start 1/8/20 

at 09:00;  Status UNV


Calcium/Vitamin D (Oscal D 500mg/ 200uts) 1 tab DAILY PO ;  Start 1/1/20 at 

12:00


Diphenhydramine HCl (Benadryl) 25 mg PRN Q6HRS  PRN PO ITCHING;  Start 1/1/20 at

11:45


Warfarin Sodium (Coumadin) 6 mg DAILY16 PO ;  Start 1/1/20 at 16:00


Warfarin Sodium (Coumadin Per Pharmacy) 1 each PRN DAILY  PRN [facility] SEE COMMENTS;  

Start 1/1/20 at 12:00





Active Scripts


Active


Reported


Alendronate Sodium 70 Mg Tablet 1 Tab PO WEEKLY


Diphenhydramine Hcl 25 Mg Tablet 25 Mg PO PRN Q6-8HRS PRN


Simvastatin 10 Mg Tablet 1 Tab PO QHS


Warfarin Sodium 6 Mg Tablet 6 Mg PO DAILY


Calcium 600 + Vit D3 Tablet (Calcium Carbonate/Vitamin D3) 1 Each Tablet 1 Tab 

PO DAILY 30 Days


Losartan Potassium 50 Mg Tablet 25 Mg PO DAILY


Furosemide 40 Mg Tablet 1 Tab PO DAILY


Klor-Con M10 (Potassium Chloride) 10 Meq Tab.er.prt 10 Meq PO DAILY





ROS:


Review of Systems


Review of System


REVIEW OF SYSTEMS:


GENERAL:  Denies weakness


SKIN:  No bruising, hair changes or rashes.


EYES:  No blurred, double or loss of vision.


NOSE AND THROAT:  No history of nosebleeds, hoarseness or sore throat.


HEART:  Has some chest pain with exertion


LUNGS:  Complains of mild shortness of breath although is improving this morning


GASTROINTESTINAL:  Denies changes in appetite, nausea, vomiting, diarrhea or


constipation.


GENITOURINARY:  No history of frequency, urgency, hesitancy or nocturia.


NEUROLOGIC:  Denies history of numbness, tingling, tremor or weakness.


PSYCHIATRIC:  No history of panic, anxiety or depression.


ENDOCRINE:  No history of heat or cold intolerance, polyuria or polydipsia.


EXTREMITIES:  Complains of lower extremity swelling and some venous stasis 

changes states she had a clot in the left leg previously in her legs remain like

this and this is chronic for her





Physical Exam:


Vital Signs:





Vital Signs








  Date Time  Temp Pulse Resp B/P (MAP) Pulse Ox O2 Delivery O2 Flow Rate FiO2


 


1/1/20 11:00 97.9 55 22 161/61 (94) 92 Room Air  





 97.9       








Physcial Exam:


GEN:   No apparent distress.  Alert and oriented


HEENT:   Normal cephalic, atraumatic, external auditory canals are patent


EYES:   Extraocular muscles are intact, pupil are equally round and reactive to 

light and accommodation


MUSCULOSKELETAL:  Well developed , well nourished, good range of motion


ENDOCRINE:   Ny thyromegaly was palpated


LYMPHATICS:   No cervical chain or axillary nodes were noted


HEMATOPOIETIC:  No bruising


NECK:   Supple, no JVD, no thyromegaly was noted


LUNGS:   Clear to auscultation in all lung fields without rhonchi or wheezing


HEART:    RRR, S!, S2 present.  Peripheral pulses intact, no obvious murmurs 

noted


ABDOMEN:   Soft, nontender.  Positive bowel sounds, no organomegaly, normal 

bowel sounds


EXTREMITIES:   The lower extremities are swollen about 2 out of 4 the left leg 

has some redness but she states this is chronic


NEUROLOGIC:   Normal speech and tone.  A&O x 3, moves all extremities, no 

obvious focal deficits


PSYCHIATRIC:   Normal affect, normal mood. Stable


SKIN:   No ulcerations or rashes, good skin turgor, no jaundice


VASCULAR:   Good capillary refill, neurovascular bundle appears to be intact





Labs:


Labs:





Laboratory Tests








Test


 12/31/19


21:30 1/1/20


02:10 1/1/20


05:15


 


White Blood Count


 6.3 x10^3/uL


(4.0-11.0) 5.5 x10^3/uL


(4.0-11.0) 





 


Red Blood Count


 4.26 x10^6/uL


(3.50-5.40) 4.10 x10^6/uL


(3.50-5.40) 





 


Hemoglobin


 13.0 g/dL


(12.0-15.5) 12.5 g/dL


(12.0-15.5) 





 


Hematocrit


 39.7 %


(36.0-47.0) 37.5 %


(36.0-47.0) 





 


Mean Corpuscular Volume 93 fL ()  91 fL ()  


 


Mean Corpuscular Hemoglobin 31 pg (25-35)  30 pg (25-35)  


 


Mean Corpuscular Hemoglobin


Concent 33 g/dL


(31-37) 33 g/dL


(31-37) 





 


Red Cell Distribution Width


 15.3 %


(11.5-14.5) 14.7 %


(11.5-14.5) 





 


Platelet Count


 214 x10^3/uL


(140-400) 191 x10^3/uL


(140-400) 





 


Neutrophils (%) (Auto) 65 % (31-73)  63 % (31-73)  


 


Lymphocytes (%) (Auto) 20 % (24-48)  21 % (24-48)  


 


Monocytes (%) (Auto) 11 % (0-9)  12 % (0-9)  


 


Eosinophils (%) (Auto) 3 % (0-3)  4 % (0-3)  


 


Basophils (%) (Auto) 1 % (0-3)  0 % (0-3)  


 


Neutrophils # (Auto)


 4.0 x10^3/uL


(1.8-7.7) 3.5 x10^3/uL


(1.8-7.7) 





 


Lymphocytes # (Auto)


 1.3 x10^3/uL


(1.0-4.8) 1.1 x10^3/uL


(1.0-4.8) 





 


Monocytes # (Auto)


 0.7 x10^3/uL


(0.0-1.1) 0.7 x10^3/uL


(0.0-1.1) 





 


Eosinophils # (Auto)


 0.2 x10^3/uL


(0.0-0.7) 0.2 x10^3/uL


(0.0-0.7) 





 


Basophils # (Auto)


 0.1 x10^3/uL


(0.0-0.2) 0.0 x10^3/uL


(0.0-0.2) 





 


Prothrombin Time


 23.7 SEC


(11.7-14.0) 


 





 


Prothromb Time International


Ratio 2.1 (0.8-1.1) 


 


 





 


Sodium Level


 141 mmol/L


(136-145) 141 mmol/L


(136-145) 





 


Potassium Level


 4.0 mmol/L


(3.5-5.1) 4.0 mmol/L


(3.5-5.1) 





 


Chloride Level


 105 mmol/L


() 106 mmol/L


() 





 


Carbon Dioxide Level


 33 mmol/L


(21-32) 33 mmol/L


(21-32) 





 


Anion Gap 3 (6-14)  2 (6-14)  


 


Blood Urea Nitrogen


 11 mg/dL


(7-20) 10 mg/dL


(7-20) 





 


Creatinine


 0.7 mg/dL


(0.6-1.0) 0.7 mg/dL


(0.6-1.0) 





 


Estimated GFR


(Cockcroft-Gault) 80.5 


 80.5 


 





 


BUN/Creatinine Ratio 16 (6-20)  14 (6-20)  


 


Glucose Level


 141 mg/dL


(70-99) 134 mg/dL


(70-99) 





 


Calcium Level


 10.3 mg/dL


(8.5-10.1) 10.0 mg/dL


(8.5-10.1) 





 


Magnesium Level


 1.9 mg/dL


(1.8-2.4) 


 





 


Total Bilirubin


 0.7 mg/dL


(0.2-1.0) 0.8 mg/dL


(0.2-1.0) 





 


Aspartate Amino Transf


(AST/SGOT) 12 U/L (15-37) 


 13 U/L (15-37) 


 





 


Alanine Aminotransferase


(ALT/SGPT) 10 U/L (14-59) 


 8 U/L (14-59) 


 





 


Alkaline Phosphatase


 98 U/L


() 87 U/L


() 





 


Troponin I Quantitative


 < 0.017 ng/mL


(0.000-0.055) < 0.017 ng/mL


(0.000-0.055) < 0.017 ng/mL


(0.000-0.055)


 


NT-Pro-B-Type Natriuretic


Peptide 492 pg/mL


(0-449) 


 





 


Total Protein


 6.9 g/dL


(6.4-8.2) 6.3 g/dL


(6.4-8.2) 





 


Albumin


 3.0 g/dL


(3.4-5.0) 2.8 g/dL


(3.4-5.0) 





 


Albumin/Globulin Ratio 0.8 (1.0-1.7)  0.8 (1.0-1.7)  








Laboratory Tests








Test


 12/31/19


21:30 1/1/20


02:10 1/1/20


05:15


 


White Blood Count


 6.3 x10^3/uL


(4.0-11.0) 5.5 x10^3/uL


(4.0-11.0) 





 


Red Blood Count


 4.26 x10^6/uL


(3.50-5.40) 4.10 x10^6/uL


(3.50-5.40) 





 


Hemoglobin


 13.0 g/dL


(12.0-15.5) 12.5 g/dL


(12.0-15.5) 





 


Hematocrit


 39.7 %


(36.0-47.0) 37.5 %


(36.0-47.0) 





 


Mean Corpuscular Volume 93 fL ()  91 fL ()  


 


Mean Corpuscular Hemoglobin 31 pg (25-35)  30 pg (25-35)  


 


Mean Corpuscular Hemoglobin


Concent 33 g/dL


(31-37) 33 g/dL


(31-37) 





 


Red Cell Distribution Width


 15.3 %


(11.5-14.5) 14.7 %


(11.5-14.5) 





 


Platelet Count


 214 x10^3/uL


(140-400) 191 x10^3/uL


(140-400) 





 


Neutrophils (%) (Auto) 65 % (31-73)  63 % (31-73)  


 


Lymphocytes (%) (Auto) 20 % (24-48)  21 % (24-48)  


 


Monocytes (%) (Auto) 11 % (0-9)  12 % (0-9)  


 


Eosinophils (%) (Auto) 3 % (0-3)  4 % (0-3)  


 


Basophils (%) (Auto) 1 % (0-3)  0 % (0-3)  


 


Neutrophils # (Auto)


 4.0 x10^3/uL


(1.8-7.7) 3.5 x10^3/uL


(1.8-7.7) 





 


Lymphocytes # (Auto)


 1.3 x10^3/uL


(1.0-4.8) 1.1 x10^3/uL


(1.0-4.8) 





 


Monocytes # (Auto)


 0.7 x10^3/uL


(0.0-1.1) 0.7 x10^3/uL


(0.0-1.1) 





 


Eosinophils # (Auto)


 0.2 x10^3/uL


(0.0-0.7) 0.2 x10^3/uL


(0.0-0.7) 





 


Basophils # (Auto)


 0.1 x10^3/uL


(0.0-0.2) 0.0 x10^3/uL


(0.0-0.2) 





 


Prothrombin Time


 23.7 SEC


(11.7-14.0) 


 





 


Prothromb Time International


Ratio 2.1 (0.8-1.1) 


 


 





 


Sodium Level


 141 mmol/L


(136-145) 141 mmol/L


(136-145) 





 


Potassium Level


 4.0 mmol/L


(3.5-5.1) 4.0 mmol/L


(3.5-5.1) 





 


Chloride Level


 105 mmol/L


() 106 mmol/L


() 





 


Carbon Dioxide Level


 33 mmol/L


(21-32) 33 mmol/L


(21-32) 





 


Anion Gap 3 (6-14)  2 (6-14)  


 


Blood Urea Nitrogen


 11 mg/dL


(7-20) 10 mg/dL


(7-20) 





 


Creatinine


 0.7 mg/dL


(0.6-1.0) 0.7 mg/dL


(0.6-1.0) 





 


Estimated GFR


(Cockcroft-Gault) 80.5 


 80.5 


 





 


BUN/Creatinine Ratio 16 (6-20)  14 (6-20)  


 


Glucose Level


 141 mg/dL


(70-99) 134 mg/dL


(70-99) 





 


Calcium Level


 10.3 mg/dL


(8.5-10.1) 10.0 mg/dL


(8.5-10.1) 





 


Magnesium Level


 1.9 mg/dL


(1.8-2.4) 


 





 


Total Bilirubin


 0.7 mg/dL


(0.2-1.0) 0.8 mg/dL


(0.2-1.0) 





 


Aspartate Amino Transf


(AST/SGOT) 12 U/L (15-37) 


 13 U/L (15-37) 


 





 


Alanine Aminotransferase


(ALT/SGPT) 10 U/L (14-59) 


 8 U/L (14-59) 


 





 


Alkaline Phosphatase


 98 U/L


() 87 U/L


() 





 


Troponin I Quantitative


 < 0.017 ng/mL


(0.000-0.055) < 0.017 ng/mL


(0.000-0.055) < 0.017 ng/mL


(0.000-0.055)


 


NT-Pro-B-Type Natriuretic


Peptide 492 pg/mL


(0-449) 


 





 


Total Protein


 6.9 g/dL


(6.4-8.2) 6.3 g/dL


(6.4-8.2) 





 


Albumin


 3.0 g/dL


(3.4-5.0) 2.8 g/dL


(3.4-5.0) 





 


Albumin/Globulin Ratio 0.8 (1.0-1.7)  0.8 (1.0-1.7)  











Images:


Images


 


History: Chest pain.


 


Comparison with 7/22/2017. Images available but no report from that study.


 


The cardiomediastinal silhouette appears slightly wider than at that time.


Aorta is tortuous and calcified, and ectatic. The overall morphology is 


similar.


 


No evidence of pneumothorax. No pleural effusion. Diffuse interstitial 


opacities in both lungs, grossly similar to prior study, likely due to 


chronic fibrosis. No focal airspace consolidation is seen. No acute bone 


process is identified.


 


IMPRESSION:


1. Mild enlargement of the cardiac silhouette.


2. Aortic tortuosity and ectasia.


3. Interstitial opacities are likely chronic fibrosis.


4. No consolidating infiltrate.





Assessment/Plan


Assessment/Plan


Chest pain intermittent acute on chronic A. fib hypertension diabetes lower 

sternal swelling and abnormal chest x-ray with pleural fibrosis and cardiomegaly











Plan


Cardiac monitoring serial enzymes /EKGs


Consult cardiology


Resume her home meds including the Coumadin


DVT prophylaxis which will be covered by her home meds anyway


PT OT


We'll hold off on IV antibiotics for that left leg redness and she states this 

is chronic for her her leg actually looks pretty good right now she states


Trend labs


Full code


When necessary IV  antihypertensives


Negative chronotropic agents per cardiology for rate control but currently her 

rates only 70-80











SIMI GONZALEZ III DO            Jan 1, 2020 13:03

## 2020-01-01 NOTE — PDOC2
CONSULT


Date of Consult


Date of Consult


DATE: 1/1/20 


TIME: 17:02





Reason for Consult


Reason for Consult:


Chest pain





Referring Physician


Referring Physician:


Dr. Cavazos





Identification/Chief Complaint


Chief Complaint


Chest pain





Source


Source:  Chart review, Patient





History of Present Illness


Reason for Visit:


The patient is an 79-year-old female who was admitted through the emergency room

with episodes of chest pressure and shortness of breath. Patient states that the

symptoms have been increasing over the past 2-3 days. Workup has included 

cardiac enzymes 3 which have been normal. Chest x-ray shows chronic fibrosis. 

EKG shows atrial fibrillation with no acute ischemic changes. INR is 2.1. The 

patient is feeling better this morning. Previous workup has included an MPI 

nuclear stress test on 3/13/19 that showed no ischemia or infarct and an 

ejection fraction of 55%.





Past Medical History


Cardiovascular:  AFIB, HTN


Pulmonary:  COPD


Endocrine:  Diabetes





Past Surgical History


Past Surgical History:  No pertinent history





Family History


Family History:  Hypertension





Social History


No


ALCOHOL:  none





Current Problem List


Problem List


Problems


Medical Problems:


(1) Chest pain


Status: Acute  





(2) Diabetes


Status: Acute  





(3) Hypertension


Status: Acute  











Current Medications


Current Medications





Current Medications


Aspirin (Children'S Aspirin) 324 mg 1X  ONCE PO  Last administered on 12/31/19at

21:01;  Start 12/31/19 at 20:30;  Stop 12/31/19 at 20:33;  Status DC


Nitroglycerin (Nitrostat) 0.4 mg PRN Q5MIN  PRN SL CP RATING > 1/10 Last 

administered on 12/31/19at 21:20;  Start 12/31/19 at 20:30;  Stop 12/31/19 at 

23:22;  Status DC


Morphine Sulfate (Morphine Sulfate) 2 mg 1X  ONCE IV ;  Start 12/31/19 at 22:30;

 Stop 12/31/19 at 22:31;  Status DC


Ondansetron HCl (Zofran) 4 mg PRN Q8HRS  PRN IV NAUSEA/VOMITING 1ST CHOICE;  

Start 12/31/19 at 23:30;  Stop 1/1/20 at 23:29


Morphine Sulfate (Morphine Sulfate) 2 mg PRN Q2HR  PRN IV SEVERE PAIN 7-10;  

Start 12/31/19 at 23:30;  Stop 1/1/20 at 23:29


Acetaminophen (Tylenol) 650 mg PRN Q4HRS  PRN PO FEVER;  Start 12/31/19 at 

23:30;  Stop 1/1/20 at 23:29


Nitroglycerin (Nitrostat) 0.4 mg PRN Q5MIN  PRN SL CHEST PAIN;  Start 12/31/19 

at 23:30;  Stop 1/1/20 at 23:29


Furosemide (Lasix) 40 mg DAILY PO  Last administered on 1/1/20at 13:03;  Start 

1/1/20 at 12:00


Losartan Potassium (Cozaar) 25 mg DAILY PO  Last administered on 1/1/20at 13:04;

 Start 1/1/20 at 12:00


Potassium Chloride (Klor-Con) 10 meq DAILY PO  Last administered on 1/1/20at 

13:03;  Start 1/1/20 at 12:00


Simvastatin (Zocor) 10 mg QHS PO ;  Start 1/1/20 at 21:00


Non-Formulary Medication (Alendronate Sodium ) 1 tab WEEKLY PO ;  Start 1/8/20 

at 09:00;  Status UNV


Calcium/Vitamin D (Oscal D 500mg/ 200uts) 1 tab DAILY PO  Last administered on 

1/1/20at 13:03;  Start 1/1/20 at 12:00


Diphenhydramine HCl (Benadryl) 25 mg PRN Q6HRS  PRN PO ITCHING;  Start 1/1/20 at

11:45


Warfarin Sodium (Coumadin) 6 mg DAILY16 PO  Last administered on 1/1/20at 16:14;

 Start 1/1/20 at 16:00


Warfarin Sodium (Coumadin Per Pharmacy) 1 each PRN DAILY  PRN [facility] SEE COMMENTS;  

Start 1/1/20 at 12:00


Amlodipine Besylate (Norvasc) 10 mg DAILY PO  Last administered on 1/1/20at 

16:41;  Start 1/1/20 at 16:00





Active Scripts


Active


Reported


Alendronate Sodium 70 Mg Tablet 1 Tab PO WEEKLY


Diphenhydramine Hcl 25 Mg Tablet 25 Mg PO PRN Q6-8HRS PRN


Simvastatin 10 Mg Tablet 1 Tab PO QHS


Warfarin Sodium 6 Mg Tablet 6 Mg PO DAILY


Calcium 600 + Vit D3 Tablet (Calcium Carbonate/Vitamin D3) 1 Each Tablet 1 Tab 

PO DAILY 30 Days


Losartan Potassium 50 Mg Tablet 25 Mg PO DAILY


Furosemide 40 Mg Tablet 1 Tab PO DAILY


Klor-Con M10 (Potassium Chloride) 10 Meq Tab.er.prt 10 Meq PO DAILY





Allergies


Allergies:  


Coded Allergies:  


     phenazopyridine (Verified  Allergy, Intermediate, 5/24/16)





ROS


Respiratory:  YES: SOB with excertion


Cardiovascular:  yes Chest Pain





Physical Exam


General:  mild distress


HEENT:  Atraumatic


Lungs:  Other (slightly decreased breath sounds)


Heart:  Other (irregularly irregular)


Abdomen:  Normal bowel sounds





Vitals


VITALS





Vital Signs








  Date Time  Temp Pulse Resp B/P (MAP) Pulse Ox O2 Delivery O2 Flow Rate FiO2


 


1/1/20 16:41  67  176/77    


 


1/1/20 15:00 98.2  18  92 Room Air  





 98.2       











Labs


Labs





Laboratory Tests








Test


 12/31/19


21:30 1/1/20


02:10 1/1/20


05:15


 


White Blood Count


 6.3 x10^3/uL


(4.0-11.0) 5.5 x10^3/uL


(4.0-11.0) 





 


Red Blood Count


 4.26 x10^6/uL


(3.50-5.40) 4.10 x10^6/uL


(3.50-5.40) 





 


Hemoglobin


 13.0 g/dL


(12.0-15.5) 12.5 g/dL


(12.0-15.5) 





 


Hematocrit


 39.7 %


(36.0-47.0) 37.5 %


(36.0-47.0) 





 


Mean Corpuscular Volume 93 fL ()  91 fL ()  


 


Mean Corpuscular Hemoglobin 31 pg (25-35)  30 pg (25-35)  


 


Mean Corpuscular Hemoglobin


Concent 33 g/dL


(31-37) 33 g/dL


(31-37) 





 


Red Cell Distribution Width


 15.3 %


(11.5-14.5) 14.7 %


(11.5-14.5) 





 


Platelet Count


 214 x10^3/uL


(140-400) 191 x10^3/uL


(140-400) 





 


Neutrophils (%) (Auto) 65 % (31-73)  63 % (31-73)  


 


Lymphocytes (%) (Auto) 20 % (24-48)  21 % (24-48)  


 


Monocytes (%) (Auto) 11 % (0-9)  12 % (0-9)  


 


Eosinophils (%) (Auto) 3 % (0-3)  4 % (0-3)  


 


Basophils (%) (Auto) 1 % (0-3)  0 % (0-3)  


 


Neutrophils # (Auto)


 4.0 x10^3/uL


(1.8-7.7) 3.5 x10^3/uL


(1.8-7.7) 





 


Lymphocytes # (Auto)


 1.3 x10^3/uL


(1.0-4.8) 1.1 x10^3/uL


(1.0-4.8) 





 


Monocytes # (Auto)


 0.7 x10^3/uL


(0.0-1.1) 0.7 x10^3/uL


(0.0-1.1) 





 


Eosinophils # (Auto)


 0.2 x10^3/uL


(0.0-0.7) 0.2 x10^3/uL


(0.0-0.7) 





 


Basophils # (Auto)


 0.1 x10^3/uL


(0.0-0.2) 0.0 x10^3/uL


(0.0-0.2) 





 


Prothrombin Time


 23.7 SEC


(11.7-14.0) 


 





 


Prothromb Time International


Ratio 2.1 (0.8-1.1) 


 


 





 


Sodium Level


 141 mmol/L


(136-145) 141 mmol/L


(136-145) 





 


Potassium Level


 4.0 mmol/L


(3.5-5.1) 4.0 mmol/L


(3.5-5.1) 





 


Chloride Level


 105 mmol/L


() 106 mmol/L


() 





 


Carbon Dioxide Level


 33 mmol/L


(21-32) 33 mmol/L


(21-32) 





 


Anion Gap 3 (6-14)  2 (6-14)  


 


Blood Urea Nitrogen


 11 mg/dL


(7-20) 10 mg/dL


(7-20) 





 


Creatinine


 0.7 mg/dL


(0.6-1.0) 0.7 mg/dL


(0.6-1.0) 





 


Estimated GFR


(Cockcroft-Gault) 80.5 


 80.5 


 





 


BUN/Creatinine Ratio 16 (6-20)  14 (6-20)  


 


Glucose Level


 141 mg/dL


(70-99) 134 mg/dL


(70-99) 





 


Calcium Level


 10.3 mg/dL


(8.5-10.1) 10.0 mg/dL


(8.5-10.1) 





 


Magnesium Level


 1.9 mg/dL


(1.8-2.4) 


 





 


Total Bilirubin


 0.7 mg/dL


(0.2-1.0) 0.8 mg/dL


(0.2-1.0) 





 


Aspartate Amino Transf


(AST/SGOT) 12 U/L (15-37) 


 13 U/L (15-37) 


 





 


Alanine Aminotransferase


(ALT/SGPT) 10 U/L (14-59) 


 8 U/L (14-59) 


 





 


Alkaline Phosphatase


 98 U/L


() 87 U/L


() 





 


Troponin I Quantitative


 < 0.017 ng/mL


(0.000-0.055) < 0.017 ng/mL


(0.000-0.055) < 0.017 ng/mL


(0.000-0.055)


 


NT-Pro-B-Type Natriuretic


Peptide 492 pg/mL


(0-449) 


 





 


Total Protein


 6.9 g/dL


(6.4-8.2) 6.3 g/dL


(6.4-8.2) 





 


Albumin


 3.0 g/dL


(3.4-5.0) 2.8 g/dL


(3.4-5.0) 





 


Albumin/Globulin Ratio 0.8 (1.0-1.7)  0.8 (1.0-1.7)  








Laboratory Tests








Test


 12/31/19


21:30 1/1/20


02:10 1/1/20


05:15


 


White Blood Count


 6.3 x10^3/uL


(4.0-11.0) 5.5 x10^3/uL


(4.0-11.0) 





 


Red Blood Count


 4.26 x10^6/uL


(3.50-5.40) 4.10 x10^6/uL


(3.50-5.40) 





 


Hemoglobin


 13.0 g/dL


(12.0-15.5) 12.5 g/dL


(12.0-15.5) 





 


Hematocrit


 39.7 %


(36.0-47.0) 37.5 %


(36.0-47.0) 





 


Mean Corpuscular Volume 93 fL ()  91 fL ()  


 


Mean Corpuscular Hemoglobin 31 pg (25-35)  30 pg (25-35)  


 


Mean Corpuscular Hemoglobin


Concent 33 g/dL


(31-37) 33 g/dL


(31-37) 





 


Red Cell Distribution Width


 15.3 %


(11.5-14.5) 14.7 %


(11.5-14.5) 





 


Platelet Count


 214 x10^3/uL


(140-400) 191 x10^3/uL


(140-400) 





 


Neutrophils (%) (Auto) 65 % (31-73)  63 % (31-73)  


 


Lymphocytes (%) (Auto) 20 % (24-48)  21 % (24-48)  


 


Monocytes (%) (Auto) 11 % (0-9)  12 % (0-9)  


 


Eosinophils (%) (Auto) 3 % (0-3)  4 % (0-3)  


 


Basophils (%) (Auto) 1 % (0-3)  0 % (0-3)  


 


Neutrophils # (Auto)


 4.0 x10^3/uL


(1.8-7.7) 3.5 x10^3/uL


(1.8-7.7) 





 


Lymphocytes # (Auto)


 1.3 x10^3/uL


(1.0-4.8) 1.1 x10^3/uL


(1.0-4.8) 





 


Monocytes # (Auto)


 0.7 x10^3/uL


(0.0-1.1) 0.7 x10^3/uL


(0.0-1.1) 





 


Eosinophils # (Auto)


 0.2 x10^3/uL


(0.0-0.7) 0.2 x10^3/uL


(0.0-0.7) 





 


Basophils # (Auto)


 0.1 x10^3/uL


(0.0-0.2) 0.0 x10^3/uL


(0.0-0.2) 





 


Prothrombin Time


 23.7 SEC


(11.7-14.0) 


 





 


Prothromb Time International


Ratio 2.1 (0.8-1.1) 


 


 





 


Sodium Level


 141 mmol/L


(136-145) 141 mmol/L


(136-145) 





 


Potassium Level


 4.0 mmol/L


(3.5-5.1) 4.0 mmol/L


(3.5-5.1) 





 


Chloride Level


 105 mmol/L


() 106 mmol/L


() 





 


Carbon Dioxide Level


 33 mmol/L


(21-32) 33 mmol/L


(21-32) 





 


Anion Gap 3 (6-14)  2 (6-14)  


 


Blood Urea Nitrogen


 11 mg/dL


(7-20) 10 mg/dL


(7-20) 





 


Creatinine


 0.7 mg/dL


(0.6-1.0) 0.7 mg/dL


(0.6-1.0) 





 


Estimated GFR


(Cockcroft-Gault) 80.5 


 80.5 


 





 


BUN/Creatinine Ratio 16 (6-20)  14 (6-20)  


 


Glucose Level


 141 mg/dL


(70-99) 134 mg/dL


(70-99) 





 


Calcium Level


 10.3 mg/dL


(8.5-10.1) 10.0 mg/dL


(8.5-10.1) 





 


Magnesium Level


 1.9 mg/dL


(1.8-2.4) 


 





 


Total Bilirubin


 0.7 mg/dL


(0.2-1.0) 0.8 mg/dL


(0.2-1.0) 





 


Aspartate Amino Transf


(AST/SGOT) 12 U/L (15-37) 


 13 U/L (15-37) 


 





 


Alanine Aminotransferase


(ALT/SGPT) 10 U/L (14-59) 


 8 U/L (14-59) 


 





 


Alkaline Phosphatase


 98 U/L


() 87 U/L


() 





 


Troponin I Quantitative


 < 0.017 ng/mL


(0.000-0.055) < 0.017 ng/mL


(0.000-0.055) < 0.017 ng/mL


(0.000-0.055)


 


NT-Pro-B-Type Natriuretic


Peptide 492 pg/mL


(0-449) 


 





 


Total Protein


 6.9 g/dL


(6.4-8.2) 6.3 g/dL


(6.4-8.2) 





 


Albumin


 3.0 g/dL


(3.4-5.0) 2.8 g/dL


(3.4-5.0) 





 


Albumin/Globulin Ratio 0.8 (1.0-1.7)  0.8 (1.0-1.7)  











Images


Images


As above.





Assessment/Plan


Assessment/Plan


1. Chest pain. Pain has resolved. No acute ischemic EKG changes. Troponin normal

3. MPI testing on 3/13/19 showed no ischemia or infarct and an ejection 

fraction of 55%. Will continue present medications.





2. Shortness of breath. Patient with a history of COPD. We'll continue pulmonary

treatments. We'll check an echocardiogram to recheck LV function and her valves.





3. Rate control paroxysmal atrial fibrillation. Continue present medications and

anticoagulation. INR is 2.1.





4. Diabetes mellitus. As per the primary service.





5. Hypertension. We'll adjust medications as needed.





6. History of reported DVT. Continue anticoagulation.





Thank you for allowing us to participate in the care of your patient.











VELMA SILVA MD             Jan 1, 2020 17:08

## 2020-01-02 VITALS — DIASTOLIC BLOOD PRESSURE: 60 MMHG | SYSTOLIC BLOOD PRESSURE: 130 MMHG

## 2020-01-02 VITALS — SYSTOLIC BLOOD PRESSURE: 140 MMHG | DIASTOLIC BLOOD PRESSURE: 59 MMHG

## 2020-01-02 VITALS — DIASTOLIC BLOOD PRESSURE: 63 MMHG | SYSTOLIC BLOOD PRESSURE: 129 MMHG

## 2020-01-02 VITALS — DIASTOLIC BLOOD PRESSURE: 58 MMHG | SYSTOLIC BLOOD PRESSURE: 121 MMHG

## 2020-01-02 VITALS — SYSTOLIC BLOOD PRESSURE: 120 MMHG | DIASTOLIC BLOOD PRESSURE: 56 MMHG

## 2020-01-02 VITALS — DIASTOLIC BLOOD PRESSURE: 52 MMHG | SYSTOLIC BLOOD PRESSURE: 138 MMHG

## 2020-01-02 LAB
CHOLEST SERPL-MCNC: 118 MG/DL (ref 0–200)
CHOLEST/HDLC SERPL: 3 {RATIO}
HDLC SERPL-MCNC: 39 MG/DL (ref 40–60)
LDLC: 64 MG/DL (ref 0–100)
PROTHROMBIN TIME: 19.3 SEC (ref 11.7–14)
TRIGL SERPL-MCNC: 77 MG/DL (ref 0–150)
VLDLC: 15 MG/DL (ref 0–40)

## 2020-01-02 RX ADMIN — FUROSEMIDE SCH MG: 40 TABLET ORAL at 10:06

## 2020-01-02 RX ADMIN — SIMVASTATIN SCH MG: 10 TABLET, FILM COATED ORAL at 20:01

## 2020-01-02 RX ADMIN — WARFARIN SODIUM SCH MG: 3 TABLET ORAL at 17:41

## 2020-01-02 RX ADMIN — ASPIRIN SCH MG: 81 TABLET, COATED ORAL at 17:47

## 2020-01-02 RX ADMIN — CALCIUM CARBONATE-VITAMIN D TAB 500 MG-200 UNIT SCH TAB: 500-200 TAB at 10:05

## 2020-01-02 RX ADMIN — POTASSIUM CHLORIDE SCH MEQ: 750 TABLET, FILM COATED, EXTENDED RELEASE ORAL at 10:05

## 2020-01-02 RX ADMIN — LOSARTAN POTASSIUM SCH MG: 50 TABLET ORAL at 10:06

## 2020-01-02 RX ADMIN — Medication PRN EACH: at 14:58

## 2020-01-02 NOTE — PDOC
CARDIO Progress Notes


Date and Time


Date of Service


1/2/20


Time of Evaluation


1200





Subjective


Subjective:  No Chest Pain, No shortness of breath, No Palpitations





Vitals


Vitals





Vital Signs








  Date Time  Temp Pulse Resp B/P (MAP) Pulse Ox O2 Delivery O2 Flow Rate FiO2


 


1/2/20 11:00 98.7 63 16 129/63 (85) 92 Room Air  





 98.7       








Weight


Weight [ ]





Input and Output


Intake and Output











Intake and Output 


 


 1/2/20





 07:00


 


Intake Total 1500 ml


 


Balance 1500 ml


 


 


 


Intake Oral 1500 ml


 


# Voids 3











Laboratory


Labs





Laboratory Tests








Test


 1/2/20


08:04


 


Prothrombin Time


 19.3 SEC


(11.7-14.0)


 


Prothromb Time International


Ratio 1.7 (0.8-1.1) 














Physical Exam


HEENT:  Neck Supple W Full Motion


Chest:  Symmetric


LUNGS:  Clear to Auscultation, Other (diminished bases)


Heart:  S1S2, RRR


Abdomen:  Soft N/T, Other (obese )


Extremities:  Other (1+ bilateral LE edema )


Neurology:  alert, oriented, follow commands





Assessment


Assessment


1. Chest pain, atypical. AMI ruled out. EKG without significant acute 

changes.MPI  3/13/19 without ischemia or infarct. Echo with preserved LV 

systolic function.  Most probably secondary to #2


2.  Hypertensive urgency; now controlled 


3.  PAFIB; s/o ablation. chronic OAC with warfarin.  INR is 1.7


4.  Diabetes, II; as per PCP


5.  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


6.  H/o pericardial effusion; uncertain etiology, s/p pericardiocentesis 

followed by pericardial window placement in the past at [facility]. Echo did not show 

an pericardial effusion





Recommendations





Continue current antiHTN therapy 


ASA


Lipid panel-statin if indications


May discharge and f/u in our office as scheduled with TIMOTHY Jimenes            Jan 2, 2020 12:10

## 2020-01-02 NOTE — PDOC
[facility] PROGRESS NOTE


Chief Complaint


Chief Complaint


CHF


A-Fib


COPD


Diabetes-Type II


DVT


Hypertension


Pericardial window





History of Present Illness


History of Present Illness


1/2/20


Pt seen and examined


DW pt about their care


Pt reported having some leg cramping overnight


DW RN


Reviewed pt's chart





Vitals/I&O


Vitals/I&O:





                                   Vital Signs








  Date Time  Temp Pulse Resp B/P (MAP) Pulse Ox O2 Delivery O2 Flow Rate FiO2


 


1/2/20 11:00 98.7 63 16 129/63 (85) 92 Room Air  





 98.7       














                                    I & O   


 


 1/1/20 1/1/20 1/2/20





 15:00 23:00 07:00


 


Intake Total 700 ml 650 ml 150 ml


 


Balance 700 ml 650 ml 150 ml











Physical Exam


General:  Alert, Oriented X3, Cooperative, No acute distress


Heart:  No murmurs, Other (irregularly irregular)


Lungs:  Clear


Abdomen:  Normal bowel sounds, Soft


Extremities:  No clubbing, No cyanosis


Skin:  No rashes, No breakdown





Labs


Labs:





Laboratory Tests








Test


 1/2/20


08:04


 


Prothrombin Time


 19.3 SEC


(11.7-14.0)


 


Prothromb Time International


Ratio 1.7 (0.8-1.1) 














Review of Systems


Review of Systems:


No c/o headaches 


No c/o SOB





Assessment and Plan


Assessmemt and Plan


Problems


Medical Problems:


(1) Chest pain


Status: Acute  





(2) Diabetes


Status: Acute  





(3) Hypertension


Status: Acute  





Assessment


CHF


A-Fib


COPD


Diabetes-Type II


DVT


Hypertension


Pericardial window





Plan


Await cardiology input


Trend cardiac enzymes


Monitor BP


Home meds


Labs


DVT Prophylaxis


PT/OT


Full code


Appreciate subspecialist input








Comment


Review of Relevant


I have reviewed the following items kayleigh (where applicable) has been applied.


Medications:





Current Medications








 Medications


  (Trade)  Dose


 Ordered  Sig/Carmine


 Route


 PRN Reason  Start Time


 Stop Time Status Last Admin


Dose Admin


 


 Furosemide


  (Lasix)  40 mg  DAILY


 PO


   1/1/20 12:00


    1/2/20 10:06





 


 Losartan Potassium


  (Cozaar)  25 mg  DAILY


 PO


   1/1/20 12:00


    1/2/20 10:06





 


 Potassium Chloride


  (Klor-Con)  10 meq  DAILY


 PO


   1/1/20 12:00


    1/2/20 10:05





 


 Simvastatin


  (Zocor)  10 mg  QHS


 PO


   1/1/20 21:00


    1/1/20 20:07





 


 Calcium/Vitamin D


  (Oscal D 500mg/


 200uts)  1 tab  DAILY


 PO


   1/1/20 12:00


    1/2/20 10:05





 


 Warfarin Sodium


  (Coumadin)  6 mg  DAILY16


 PO


   1/1/20 16:00


    1/1/20 16:14





 


 Warfarin Sodium


  (Coumadin Per


 Pharmacy)  1 each  PRN DAILY  PRN


 [facility]


 SEE COMMENTS  1/1/20 12:00


    1/1/20 16:58





 


 Amlodipine


 Besylate


  (Norvasc)  10 mg  DAILY


 PO


   1/1/20 16:00


    1/2/20 10:06




















SIMI GONZALEZ III DO            Jan 2, 2020 11:59

## 2020-01-02 NOTE — CARD
MR#: [id number]

Account#: [id number]

Accession#: [id number]

Date of Study: 01/02/2020

Ordering Physician: VELMA SILVA, 

Referring Physician: VELMA SILVA, 

Tech: Sunshine Gandhi SAGE





--------------- APPROVED REPORT --------------





EXAM: Two-dimensional and M-mode echocardiogram with Doppler and color Doppler.



Other Information 

Quality : Good



INDICATION

Dyspnea 



2D DIMENSIONS 

RVDd3.0 (2.9-3.5cm)Left Atrium(2D)5.2 (1.6-4.0cm)

IVSd1.0 (0.7-1.1cm)Aortic Root(2D)3.2 (2.0-3.7cm)

LVDd6.4 (3.9-5.9cm)LVOT Diameter2.3 (1.8-2.4cm)

PWd1.0 (0.7-1.1cm)LVDs5.1 (2.5-4.0cm)

FS (%) 20.2 %SV83.2 ml

LVEF(%)40.5 (>50%)



Aortic Valve

AoV Peak Glen.110.0cm/sAoV VTI20.2cm

AO Peak GR.4.8mmHgLVOT Peak Glen.108.7cm/s

AO Mean GR.3mmHgAVA (VMAX)4.03cm2

GABRIELA   (VTI)4.50cm2



Mitral Valve

MV E Qgazedxo99.8cm/sMV DECEL SONC510tv

MV A Hrpzvvnl55.7cm/sE/A  Ratio0.5



Pulmonary Vein

S1 Fmdpshmt09.4cm/sD2 Coiqhkfh42.6cm/s



 LEFT VENTRICLE 

The Left Ventricle is mildly dilated. There is normal left ventricular wall thickness. The left ventr
icular systolic function is normal and the ejection fraction is within normal range. EF 50-55% Septal
 motion consistent with conduction abnormality. Otherwise, normal wall motion. Transmitral Doppler fl
ow pattern is Grade I-abnormal relaxation pattern.



 RIGHT VENTRICLE 

The right ventricle is normal size. The right ventricular systolic function is normal.



 ATRIA 

The left atrium is moderately dilated. The right atrium size is normal. The interatrial septum is int
act with no evidence for an atrial septal defect or patent foramen ovale as noted on 2-D or Doppler i
maging.



 AORTIC VALVE 

The aortic valve is calcified but opens well. Doppler and Color Flow revealed trace aortic regurgitat
ion. There is no significant aortic valvular stenosis.



 MITRAL VALVE 

The mitral valve is calcified but opens well. There is no evidence of mitral valve prolapse. There is
 no mitral valve stenosis. Doppler and Color-flow revealed trace mitral regurgitation.



 TRICUSPID VALVE 

The tricuspid valve is normal in structure and function. Doppler and Color Flow revealed trace tricus
pid regurgitation. There is no tricuspid valve stenosis.



 PULMONIC VALVE 

The pulmonic valve is not well visualized. Doppler and Color Flow revealed mild pulmonic valvular reg
urgitation. There is no pulmonic valvular stenosis.



 GREAT VESSELS 

The aortic root is normal in size. The ascending aorta is mildly dilated at 3.4 cm. The IVC is normal
 in size and collapses >50% with inspiration.



 PERICARDIAL EFFUSION 

There is no evidence of significant pericardial effusion.



Critical Notification

Critical Value: No



<Conclusion>

The left ventricular systolic function is normal and the ejection fraction is within normal range. EF
 50-55%

Septal motion consistent with conduction abnormality. Otherwise, normal wall motion.

The ascending aorta is mildly dilated at 3.4 cm.



Signed by : Ron Sorto, 

Electronically Approved : 01/02/2020 13:33:22

## 2020-01-02 NOTE — EKG
[facility]

              [address] [place] [address]

Test Date:    2019               Test Time:    20:33:54

Pat Name:     PRIYA CHAVIRA          Department:   

Patient ID:   [id number]           Room:          

Gender:       F                        Technician:   

:          [id number]               Requested By: DELROY GONZALES

Order Number: [id number]           Reading MD:     

                                 Measurements

Intervals                              Axis          

Rate:         76                       P:            43

NV:           256                      QRS:          -28

QRSD:         124                      T:            5

QT:           348                                    

QTc:          391                                    

                           Interpretive Statements

SINUS RHYTHM

PROLONGED NV INTERVAL

LEFTWARD AXIS

LVH WITH REPOLARIZATION ABNORMALITY

ABNORMAL ECG

RI6.01

No previous ECG available for comparison

## 2020-01-03 VITALS — DIASTOLIC BLOOD PRESSURE: 40 MMHG | SYSTOLIC BLOOD PRESSURE: 116 MMHG

## 2020-01-03 VITALS — SYSTOLIC BLOOD PRESSURE: 140 MMHG | DIASTOLIC BLOOD PRESSURE: 54 MMHG

## 2020-01-03 VITALS
DIASTOLIC BLOOD PRESSURE: 52 MMHG | SYSTOLIC BLOOD PRESSURE: 128 MMHG | SYSTOLIC BLOOD PRESSURE: 128 MMHG | DIASTOLIC BLOOD PRESSURE: 52 MMHG

## 2020-01-03 VITALS — DIASTOLIC BLOOD PRESSURE: 49 MMHG | SYSTOLIC BLOOD PRESSURE: 124 MMHG

## 2020-01-03 LAB
ANION GAP SERPL CALC-SCNC: 7 MMOL/L (ref 6–14)
BASOPHILS # BLD AUTO: 0 X10^3/UL (ref 0–0.2)
BASOPHILS NFR BLD: 1 % (ref 0–3)
BUN SERPL-MCNC: 21 MG/DL (ref 7–20)
CALCIUM SERPL-MCNC: 10.2 MG/DL (ref 8.5–10.1)
CHLORIDE SERPL-SCNC: 103 MMOL/L (ref 98–107)
CO2 SERPL-SCNC: 33 MMOL/L (ref 21–32)
CREAT SERPL-MCNC: 0.7 MG/DL (ref 0.6–1)
EOSINOPHIL NFR BLD: 0.2 X10^3/UL (ref 0–0.7)
EOSINOPHIL NFR BLD: 4 % (ref 0–3)
ERYTHROCYTE [DISTWIDTH] IN BLOOD BY AUTOMATED COUNT: 14.9 % (ref 11.5–14.5)
GFR SERPLBLD BASED ON 1.73 SQ M-ARVRAT: 80.5 ML/MIN
GLUCOSE SERPL-MCNC: 128 MG/DL (ref 70–99)
HCT VFR BLD CALC: 38.1 % (ref 36–47)
HGB BLD-MCNC: 12.7 G/DL (ref 12–15.5)
LYMPHOCYTES # BLD: 1.2 X10^3/UL (ref 1–4.8)
LYMPHOCYTES NFR BLD AUTO: 21 % (ref 24–48)
MCH RBC QN AUTO: 31 PG (ref 25–35)
MCHC RBC AUTO-ENTMCNC: 33 G/DL (ref 31–37)
MCV RBC AUTO: 92 FL (ref 79–100)
MONO #: 0.7 X10^3/UL (ref 0–1.1)
MONOCYTES NFR BLD: 13 % (ref 0–9)
NEUT #: 3.3 X10^3/UL (ref 1.8–7.7)
NEUTROPHILS NFR BLD AUTO: 61 % (ref 31–73)
PLATELET # BLD AUTO: 199 X10^3/UL (ref 140–400)
POTASSIUM SERPL-SCNC: 4 MMOL/L (ref 3.5–5.1)
PROTHROMBIN TIME: 18 SEC (ref 11.7–14)
RBC # BLD AUTO: 4.14 X10^6/UL (ref 3.5–5.4)
SODIUM SERPL-SCNC: 143 MMOL/L (ref 136–145)
WBC # BLD AUTO: 5.4 X10^3/UL (ref 4–11)

## 2020-01-03 RX ADMIN — POTASSIUM CHLORIDE SCH MEQ: 750 TABLET, FILM COATED, EXTENDED RELEASE ORAL at 09:18

## 2020-01-03 RX ADMIN — FUROSEMIDE SCH MG: 40 TABLET ORAL at 09:19

## 2020-01-03 RX ADMIN — CALCIUM CARBONATE-VITAMIN D TAB 500 MG-200 UNIT SCH TAB: 500-200 TAB at 09:19

## 2020-01-03 RX ADMIN — ASPIRIN SCH MG: 81 TABLET, COATED ORAL at 09:18

## 2020-01-03 RX ADMIN — LOSARTAN POTASSIUM SCH MG: 50 TABLET ORAL at 09:19

## 2020-01-03 RX ADMIN — Medication PRN EACH: at 15:30

## 2020-01-03 NOTE — SNU/HH DC
DISCHARGE WITH HOME HEALTH


DISCHARGE INFORMATION:


Final Diagnosis:


Problems


Medical Problems:


(1) Chest pain


Status: Acute  





(2) Diabetes


Status: Acute  





(3) Hypertension


Status: Acute  








Condition on Discharge:  Stable





CODE STATUS:


Code Status:  Full





HOME HEALTH:


Face to Face:


I certify this patient is under my care and that I, or a nurse practitioner or 

physician's assistant working with me, had a face to face encounter that meets 

the physician face to face encounter requirements with this patient on [].


Medical Complications:  Other (A. fib diabetes hypertension angina)


Skilled Nursing For:  Assess & Educate Safety


RN For Eval/Treatment:  Yes


Physical Therapy For:  Evalulation/Treatment


Speech Language Pathology For:  Evaluation/Treatment


Home Health Aide For:  Self-care


MSW For:  [employer]


Pt Meets Homebound Status:  Poor coordination w/ amb.





POST DISCHARGE ORDERS:


DIET AFTER DISCHARGE:  Cardiac





CERTIFICATION STATEMENT:


Certification Statement:


Certification Statement: Based on the above finding, I certify that this patient

 is confined to the home and needs intermittent skilled nursing care, physical 

therapy and/or speech therapy, or continues to need occupational therapy.~ This 

patient is under my care, and I have initiated the establishment of the plan of 

care.~ This patient will be followed by myself or a community physician who will

 periodically review the plan of care.


Home Meds


Reported Medications


Alendronate Sodium (ALENDRONATE SODIUM) 70 Mg Tablet, 1 TAB PO WEEKLY for bone 

replacement/supplement, #4 TAB 3 Refills


   1/1/20


Diphenhydramine Hcl (DIPHENHYDRAMINE HCL) 25 Mg Tablet, 25 MG PO PRN Q6-8HRS PRN

 for HIVES, TAB


   1/1/20


Simvastatin (SIMVASTATIN) 10 Mg Tablet, 1 TAB PO QHS for high cholesterol, #30 

TAB 5 Refills


   1/1/20


Warfarin Sodium (WARFARIN SODIUM) 6 Mg Tablet, 6 MG PO DAILY for afib/dvt, #30 

TAB


   1/1/20


Calcium Carbonate/Vitamin D3 (CALCIUM 600 + VIT D3 TABLET) 1 Each Tablet, 1 TAB 

PO DAILY for supplement for 30 Days, #30 TAB 0 Refills


   1/1/20


Losartan Potassium (LOSARTAN POTASSIUM) 50 Mg Tablet, 25 MG PO DAILY for 

HYPERTENSION, TAB


   1/1/20


Furosemide (FUROSEMIDE) 40 Mg Tablet, 1 TAB PO DAILY for diuretic, #30 TAB 5 

Refills


   1/1/20


Potassium Chloride (KLOR-CON M10) 10 Meq Tab.er.prt, 10 MEQ PO DAILY for 

supplement, TAB.SR


   1/1/20











SIMI GONZALEZ III DO            Sukh 3, 2020 11:17

## 2020-01-03 NOTE — PDOC
[facility] PROGRESS NOTE


Chief Complaint


Chief Complaint


CHF


A-Fib


COPD


Diabetes-Type II


DVT


Hypertension


Pericardial window





History of Present Illness


History of Present Illness


1/3/2020


Patient seen and examined


Chart reviewed


Discussed with RN


Patient seems to be at her baseline so I put a discharge in








1/2/20


Pt seen and examined


DW pt about their care


Pt reported having some leg cramping overnight


DW RN


Reviewed pt's chart





Vitals/I&O


Vitals/I&O:





                                   Vital Signs








  Date Time  Temp Pulse Resp B/P (MAP) Pulse Ox O2 Delivery O2 Flow Rate FiO2


 


1/3/20 11:20 98.7 60 18 124/49 (74) 98 Room Air  





 98.7       














                                    I & O   


 


 1/2/20 1/2/20 1/3/20





 15:00 23:00 07:00


 


Intake Total 550 ml 300 ml 200 ml


 


Balance 550 ml 300 ml 200 ml











Physical Exam


General:  Alert, Oriented X3, Cooperative, No acute distress


Heart:  No murmurs, Other (irregularly irregular)


Lungs:  Clear


Abdomen:  Normal bowel sounds, Soft


Extremities:  No clubbing, No cyanosis


Skin:  No rashes, No breakdown





Labs


Labs:





Laboratory Tests








Test


 1/3/20


04:30


 


White Blood Count


 5.4 x10^3/uL


(4.0-11.0)


 


Red Blood Count


 4.14 x10^6/uL


(3.50-5.40)


 


Hemoglobin


 12.7 g/dL


(12.0-15.5)


 


Hematocrit


 38.1 %


(36.0-47.0)


 


Mean Corpuscular Volume 92 fL () 


 


Mean Corpuscular Hemoglobin 31 pg (25-35) 


 


Mean Corpuscular Hemoglobin


Concent 33 g/dL


(31-37)


 


Red Cell Distribution Width


 14.9 %


(11.5-14.5)


 


Platelet Count


 199 x10^3/uL


(140-400)


 


Neutrophils (%) (Auto) 61 % (31-73) 


 


Lymphocytes (%) (Auto) 21 % (24-48) 


 


Monocytes (%) (Auto) 13 % (0-9) 


 


Eosinophils (%) (Auto) 4 % (0-3) 


 


Basophils (%) (Auto) 1 % (0-3) 


 


Neutrophils # (Auto)


 3.3 x10^3/uL


(1.8-7.7)


 


Lymphocytes # (Auto)


 1.2 x10^3/uL


(1.0-4.8)


 


Monocytes # (Auto)


 0.7 x10^3/uL


(0.0-1.1)


 


Eosinophils # (Auto)


 0.2 x10^3/uL


(0.0-0.7)


 


Basophils # (Auto)


 0.0 x10^3/uL


(0.0-0.2)


 


Prothrombin Time


 18.0 SEC


(11.7-14.0)


 


Prothromb Time International


Ratio 1.5 (0.8-1.1) 














Assessment and Plan


Assessmemt and Plan


Problems


Medical Problems:


(1) Chest pain


Status: Acute  





(2) Diabetes


Status: Acute  





(3) Hypertension


Status: Acute  





Assessment


CHF


A-Fib


COPD


Diabetes-Type II


DVT


Hypertension


Pericardial window





Plan


Discharge home if okay with consultants


Monitor BP


Home meds


Labs


DVT Prophylaxis


PT/OT


Full code


Appreciate subspecialist input





Comment


Review of Relevant


I have reviewed the following items kayleigh (where applicable) has been applied.


Medications:





Current Medications








 Medications


  (Trade)  Dose


 Ordered  Sig/Carmine


 Route


 PRN Reason  Start Time


 Stop Time Status Last Admin


Dose Admin


 


 Aspirin


  (Ecotrin)  81 mg  [id number]


 PO


   1/2/20 16:30


    1/3/20 09:18




















SIMI GONZALEZ III DO            Sukh 3, 2020 11:43

## 2020-01-03 NOTE — NUR
Discharge Note:



PRIYA CHAVIRA6 [facility]



Discharge instructions and discharge home medications reviewed with patient and a copy 
given. All questions have been answered and understanding verbalized. 



The following instructions and handouts were given: 

Take home meds as instructed.

Amlodipine 10 mg tablet , 1 tablet daily. Prescription called to [facility] pharmacy at 1600.

Follow up with PCP in a week, cardiology as scheduled.





Discontinued lines and drains:peripheral IV intact, patient tolerated removal, no 
complications noted.



Patient discharged to Home with self care via wheelchair accompanied by family member at 
1530.

## 2020-01-04 NOTE — DS
DATE OF DISCHARGE:  01/03/2020



ADMISSION DIAGNOSES:  Chest pain, atrial fibrillation, hypertension.



DISCHARGE DIAGNOSES:  Resolving atypical chest pain, resolving hypertension,

resolving atrial fibrillation, diabetes, overweight.



HOSPITAL COURSE:  The patient is a pleasant 79-year-old female who presented

with chest pain.  She was also having some AFib and accelerated hypertension and

her glucose was high.  We admitted her.  We consulted Cardiology, did cardiac

monitoring and a brief cardiac workup.  We got her blood pressure under control,

her rate was under control.  Yesterday, I saw and examined, she was in the

chair, requesting to go home.  Heart tones were normal.  Lungs were clear.  We

discharged to home.



DISPOSITION:  Home.



ACTIVITY:  As tolerated.



DIET:  Cardiac.



MEDICATIONS:  Please see the MRAD.



TOTAL TIME:  34 minutes.

 



______________________________

SIMI GONZALEZ DO



DR:  MORTEZA/marisol  JOB#:  [id number] / [id number]

DD:  01/04/2020 09:06  DT:  01/04/2020 09:16

## 2020-03-04 ENCOUNTER — HOSPITAL ENCOUNTER (OUTPATIENT)
Dept: HOSPITAL 61 - RAD | Age: 80
Discharge: HOME | End: 2020-03-04
Attending: FAMILY MEDICINE
Payer: MEDICARE

## 2020-03-04 DIAGNOSIS — Q25.46: ICD-10-CM

## 2020-03-04 DIAGNOSIS — I51.7: Primary | ICD-10-CM

## 2020-03-04 DIAGNOSIS — J44.9: ICD-10-CM

## 2020-03-04 DIAGNOSIS — I70.0: ICD-10-CM

## 2020-03-04 PROCEDURE — 71046 X-RAY EXAM CHEST 2 VIEWS: CPT

## 2020-03-04 NOTE — RAD
EXAM: CHEST PA   LATERAL

 

INDICATION: COPD.

 

TECHNIQUE: PA and lateral views of the chest

 

COMPARISON: 12/31/2019

 

FINDINGS:

 

Moderate cardiomegaly. Stable.

 

Aortic calcification and tortuosity redemonstrated.

 

There is no hilar or mediastinal mass.

 

Hyperinflated lungs. No focal infiltrates.

 

There is no pleural effusion or pneumothorax.

 

There are no significant osseous abnormalities.

 

IMPRESSION:

 

Cardiomegaly and COPD with no acute superimposed process shown by x-ray.

 

 

Electronically signed by: Nav Lugo MD (3/4/2020 2:54 PM) 

[id number]

## 2020-05-19 ENCOUNTER — HOSPITAL ENCOUNTER (INPATIENT)
Dept: HOSPITAL 61 - ER | Age: 80
Discharge: ANOTHER HOSPITAL | DRG: 871 | End: 2020-05-19
Attending: FAMILY MEDICINE | Admitting: FAMILY MEDICINE
Payer: MEDICARE

## 2020-05-19 VITALS — DIASTOLIC BLOOD PRESSURE: 51 MMHG | SYSTOLIC BLOOD PRESSURE: 139 MMHG

## 2020-05-19 VITALS — DIASTOLIC BLOOD PRESSURE: 61 MMHG | SYSTOLIC BLOOD PRESSURE: 122 MMHG

## 2020-05-19 VITALS — SYSTOLIC BLOOD PRESSURE: 104 MMHG | DIASTOLIC BLOOD PRESSURE: 48 MMHG

## 2020-05-19 VITALS — WEIGHT: 210.76 LBS | HEIGHT: 64 IN | BODY MASS INDEX: 35.98 KG/M2

## 2020-05-19 VITALS — SYSTOLIC BLOOD PRESSURE: 117 MMHG | DIASTOLIC BLOOD PRESSURE: 62 MMHG

## 2020-05-19 VITALS — SYSTOLIC BLOOD PRESSURE: 142 MMHG | DIASTOLIC BLOOD PRESSURE: 58 MMHG

## 2020-05-19 VITALS — SYSTOLIC BLOOD PRESSURE: 129 MMHG | DIASTOLIC BLOOD PRESSURE: 57 MMHG

## 2020-05-19 VITALS — DIASTOLIC BLOOD PRESSURE: 72 MMHG | SYSTOLIC BLOOD PRESSURE: 133 MMHG

## 2020-05-19 VITALS — SYSTOLIC BLOOD PRESSURE: 124 MMHG | DIASTOLIC BLOOD PRESSURE: 64 MMHG

## 2020-05-19 DIAGNOSIS — K68.19: ICD-10-CM

## 2020-05-19 DIAGNOSIS — I48.91: ICD-10-CM

## 2020-05-19 DIAGNOSIS — J44.9: ICD-10-CM

## 2020-05-19 DIAGNOSIS — E78.00: ICD-10-CM

## 2020-05-19 DIAGNOSIS — K66.1: ICD-10-CM

## 2020-05-19 DIAGNOSIS — E43: ICD-10-CM

## 2020-05-19 DIAGNOSIS — E66.9: ICD-10-CM

## 2020-05-19 DIAGNOSIS — Z88.8: ICD-10-CM

## 2020-05-19 DIAGNOSIS — Z86.718: ICD-10-CM

## 2020-05-19 DIAGNOSIS — E78.5: ICD-10-CM

## 2020-05-19 DIAGNOSIS — S22.32XA: ICD-10-CM

## 2020-05-19 DIAGNOSIS — Z82.49: ICD-10-CM

## 2020-05-19 DIAGNOSIS — Z82.5: ICD-10-CM

## 2020-05-19 DIAGNOSIS — N39.0: ICD-10-CM

## 2020-05-19 DIAGNOSIS — N28.1: ICD-10-CM

## 2020-05-19 DIAGNOSIS — K11.5: ICD-10-CM

## 2020-05-19 DIAGNOSIS — N20.0: ICD-10-CM

## 2020-05-19 DIAGNOSIS — M50.90: ICD-10-CM

## 2020-05-19 DIAGNOSIS — M19.90: ICD-10-CM

## 2020-05-19 DIAGNOSIS — E04.2: ICD-10-CM

## 2020-05-19 DIAGNOSIS — Z79.01: ICD-10-CM

## 2020-05-19 DIAGNOSIS — I50.32: ICD-10-CM

## 2020-05-19 DIAGNOSIS — Z87.891: ICD-10-CM

## 2020-05-19 DIAGNOSIS — E27.8: ICD-10-CM

## 2020-05-19 DIAGNOSIS — A41.9: Primary | ICD-10-CM

## 2020-05-19 DIAGNOSIS — I11.0: ICD-10-CM

## 2020-05-19 DIAGNOSIS — Z91.018: ICD-10-CM

## 2020-05-19 DIAGNOSIS — Z86.73: ICD-10-CM

## 2020-05-19 DIAGNOSIS — E11.9: ICD-10-CM

## 2020-05-19 LAB
% LYMPHS: 3 % (ref 24–48)
% MONOS: 8 % (ref 0–10)
% SEGS: 89 % (ref 35–66)
ALBUMIN SERPL-MCNC: 2.1 G/DL (ref 3.4–5)
ALBUMIN/GLOB SERPL: 0.5 {RATIO} (ref 1–1.7)
ALP SERPL-CCNC: 82 U/L (ref 46–116)
ALT SERPL-CCNC: 8 U/L (ref 14–59)
ANION GAP SERPL CALC-SCNC: 3 MMOL/L (ref 6–14)
ANISOCYTOSIS BLD QL SMEAR: (no result)
APTT BLD: 94 SEC (ref 24–38)
APTT PPP: YELLOW S
AST SERPL-CCNC: 16 U/L (ref 15–37)
BACTERIA #/AREA URNS HPF: (no result) /HPF
BASOPHILS # BLD AUTO: 0 X10^3/UL (ref 0–0.2)
BASOPHILS # BLD AUTO: 0 X10^3/UL (ref 0–0.2)
BASOPHILS NFR BLD: 0 % (ref 0–3)
BASOPHILS NFR BLD: 0 % (ref 0–3)
BILIRUB SERPL-MCNC: 0.6 MG/DL (ref 0.2–1)
BILIRUB UR QL STRIP: NEGATIVE
BUN SERPL-MCNC: 17 MG/DL (ref 7–20)
BUN/CREAT SERPL: 19 (ref 6–20)
CALCIUM SERPL-MCNC: 10.2 MG/DL (ref 8.5–10.1)
CHLORIDE SERPL-SCNC: 100 MMOL/L (ref 98–107)
CK SERPL-CCNC: 24 U/L (ref 26–192)
CO2 SERPL-SCNC: 34 MMOL/L (ref 21–32)
CREAT SERPL-MCNC: 0.9 MG/DL (ref 0.6–1)
DACRYOCYTES BLD QL SMEAR: (no result)
EOSINOPHIL NFR BLD: 0 % (ref 0–3)
EOSINOPHIL NFR BLD: 0 X10^3/UL (ref 0–0.7)
EOSINOPHIL NFR BLD: 0.1 X10^3/UL (ref 0–0.7)
EOSINOPHIL NFR BLD: 1 % (ref 0–3)
ERYTHROCYTE [DISTWIDTH] IN BLOOD BY AUTOMATED COUNT: 20.1 % (ref 11.5–14.5)
ERYTHROCYTE [DISTWIDTH] IN BLOOD BY AUTOMATED COUNT: 20.8 % (ref 11.5–14.5)
FIBRINOGEN PPP-MCNC: CLEAR MG/DL
GFR SERPLBLD BASED ON 1.73 SQ M-ARVRAT: 60.2 ML/MIN
GLOBULIN SER-MCNC: 4 G/DL (ref 2.2–3.8)
GLUCOSE SERPL-MCNC: 165 MG/DL (ref 70–99)
HCT VFR BLD CALC: 29.5 % (ref 36–47)
HCT VFR BLD CALC: 32.7 % (ref 36–47)
HGB BLD-MCNC: 10.7 G/DL (ref 12–15.5)
HGB BLD-MCNC: 9.7 G/DL (ref 12–15.5)
LIPASE: 37 U/L (ref 73–393)
LYMPHOCYTES # BLD: 0.4 X10^3/UL (ref 1–4.8)
LYMPHOCYTES # BLD: 0.8 X10^3/UL (ref 1–4.8)
LYMPHOCYTES NFR BLD AUTO: 3 % (ref 24–48)
LYMPHOCYTES NFR BLD AUTO: 6 % (ref 24–48)
MAGNESIUM SERPL-MCNC: 1.8 MG/DL (ref 1.8–2.4)
MCH RBC QN AUTO: 28 PG (ref 25–35)
MCH RBC QN AUTO: 29 PG (ref 25–35)
MCHC RBC AUTO-ENTMCNC: 33 G/DL (ref 31–37)
MCHC RBC AUTO-ENTMCNC: 33 G/DL (ref 31–37)
MCV RBC AUTO: 86 FL (ref 79–100)
MCV RBC AUTO: 88 FL (ref 79–100)
MONO #: 0.9 X10^3/UL (ref 0–1.1)
MONO #: 1 X10^3/UL (ref 0–1.1)
MONOCYTES NFR BLD: 8 % (ref 0–9)
MONOCYTES NFR BLD: 8 % (ref 0–9)
NEUT #: 10.8 X10^3/UL (ref 1.8–7.7)
NEUT #: 9.7 X10^3/UL (ref 1.8–7.7)
NEUTROPHILS NFR BLD AUTO: 85 % (ref 31–73)
NEUTROPHILS NFR BLD AUTO: 88 % (ref 31–73)
NITRITE UR QL STRIP: NEGATIVE
PH UR STRIP: 6 [PH]
PLATELET # BLD AUTO: 231 X10^3/UL (ref 140–400)
PLATELET # BLD AUTO: 299 X10^3/UL (ref 140–400)
PLATELET # BLD EST: ADEQUATE 10*3/UL
POLYCHROMASIA BLD QL SMEAR: SLIGHT
POTASSIUM SERPL-SCNC: 4.1 MMOL/L (ref 3.5–5.1)
PROT SERPL-MCNC: 6.1 G/DL (ref 6.4–8.2)
PROT UR STRIP-MCNC: NEGATIVE MG/DL
PROTHROMBIN TIME: 22.3 SEC (ref 11.7–14)
PROTHROMBIN TIME: 26.2 SEC (ref 11.7–14)
PROTHROMBIN TIME: > 120 SEC (ref 11.7–14)
RBC # BLD AUTO: 3.37 X10^6/UL (ref 3.5–5.4)
RBC # BLD AUTO: 3.79 X10^6/UL (ref 3.5–5.4)
RBC #/AREA URNS HPF: (no result) /HPF (ref 0–2)
SODIUM SERPL-SCNC: 137 MMOL/L (ref 136–145)
SQUAMOUS #/AREA URNS LPF: (no result) /LPF
UROBILINOGEN UR-MCNC: 1 MG/DL
WBC # BLD AUTO: 11 X10^3/UL (ref 4–11)
WBC # BLD AUTO: 12.6 X10^3/UL (ref 4–11)
YEAST #/AREA URNS HPF: PRESENT /HPF

## 2020-05-19 PROCEDURE — 85007 BL SMEAR W/DIFF WBC COUNT: CPT

## 2020-05-19 PROCEDURE — 82553 CREATINE MB FRACTION: CPT

## 2020-05-19 PROCEDURE — 82962 GLUCOSE BLOOD TEST: CPT

## 2020-05-19 PROCEDURE — 80053 COMPREHEN METABOLIC PANEL: CPT

## 2020-05-19 PROCEDURE — 93005 ELECTROCARDIOGRAM TRACING: CPT

## 2020-05-19 PROCEDURE — 84484 ASSAY OF TROPONIN QUANT: CPT

## 2020-05-19 PROCEDURE — G0378 HOSPITAL OBSERVATION PER HR: HCPCS

## 2020-05-19 PROCEDURE — 83605 ASSAY OF LACTIC ACID: CPT

## 2020-05-19 PROCEDURE — 83735 ASSAY OF MAGNESIUM: CPT

## 2020-05-19 PROCEDURE — 85610 PROTHROMBIN TIME: CPT

## 2020-05-19 PROCEDURE — 85025 COMPLETE CBC W/AUTO DIFF WBC: CPT

## 2020-05-19 PROCEDURE — 70450 CT HEAD/BRAIN W/O DYE: CPT

## 2020-05-19 PROCEDURE — 83690 ASSAY OF LIPASE: CPT

## 2020-05-19 PROCEDURE — 86927 PLASMA FRESH FROZEN: CPT

## 2020-05-19 PROCEDURE — 86850 RBC ANTIBODY SCREEN: CPT

## 2020-05-19 PROCEDURE — P9017 PLASMA 1 DONOR FRZ W/IN 8 HR: HCPCS

## 2020-05-19 PROCEDURE — 87086 URINE CULTURE/COLONY COUNT: CPT

## 2020-05-19 PROCEDURE — 87040 BLOOD CULTURE FOR BACTERIA: CPT

## 2020-05-19 PROCEDURE — 86901 BLOOD TYPING SEROLOGIC RH(D): CPT

## 2020-05-19 PROCEDURE — 85730 THROMBOPLASTIN TIME PARTIAL: CPT

## 2020-05-19 PROCEDURE — 30233K1 TRANSFUSION OF NONAUTOLOGOUS FROZEN PLASMA INTO PERIPHERAL VEIN, PERCUTANEOUS APPROACH: ICD-10-PCS | Performed by: INTERNAL MEDICINE

## 2020-05-19 PROCEDURE — 74177 CT ABD & PELVIS W/CONTRAST: CPT

## 2020-05-19 PROCEDURE — 86900 BLOOD TYPING SEROLOGIC ABO: CPT

## 2020-05-19 PROCEDURE — 36415 COLL VENOUS BLD VENIPUNCTURE: CPT

## 2020-05-19 PROCEDURE — 72125 CT NECK SPINE W/O DYE: CPT

## 2020-05-19 PROCEDURE — 81001 URINALYSIS AUTO W/SCOPE: CPT

## 2020-05-19 NOTE — EKG
[facility]

              [address] [place] [address]

Test Date:    2020               Test Time:    05:51:17

Pat Name:     PRIYA CHAVIRA          Department:   

Patient ID:   [id number]           Room:          

Gender:       F                        Technician:   

:          [id number]               Requested By: MELISSA DENG

Order Number: [id number]           Reading MD:   Tani Holbrook

                                 Measurements

Intervals                              Axis          

Rate:         96                       P:            167

MD:           190                      QRS:          214

QRSD:         114                      T:            122

QT:           338                                    

QTc:          428                                    

                           Interpretive Statements

SINUS RHYTHM

QRS(T) CONTOUR ABNORMALITY

CONSISTENT WITH ANTEROSEPTAL INFARCT

PROBABLY OLD

CONSISTENT WITH HIGH LATERAL INFARCT

AGE UNDETERMINED

ABNORMAL ECG



Electronically Signed On 2020 8:45:11 CDT by Tani Holbrook

## 2020-05-19 NOTE — PDOC1
History and Physical


Date of Admission


Date of Admission


DATE: 5/19/20 


TIME: 10:07





Identification/Chief Complaint


Chief Complaint


SEEN IN ER WITH LARGE LEFT RETROPERITONEAL HEMATOMA , POSSIBLE RENAL INJURY  

from 10th rib laceration 


Left ureter is displaced  anteromedially as is the left kidney. Right renal 

parapelvic cyst is  present. No hydronephrosis is visualized. 


79  year old F past medical history of diabetes, obesity, hypertension, HLD, 

HRpEF, hyperlipidemia, COPD on 1.5L O2, atrial fibrillation and h/o DVT on 

Coumadin, with recent stroke May 9, presents the ED from home with complaints of

" it is my COPD." States she's have sharp nonradiating LLQ abdominal pain that 

started around 4am, last meal 5pm last night.  Reports associated nausea, 

nonbloody nonbilious vomiting.  INR> 15  D/W ER DR Suggest transfer to [facility], as 

we do not have urology services here





Past Medical History


Past Medical History:  A-Fib, COPD, Diabetes-Type II, DVT, High Cholesterol, H

ypertension, Stroke


Past Surgical History:  Other


Additional Past Surgical Histo:  ovarian cyst surgery, cyst removal from neck


Smoking Status:  Former Smoker


Alcohol Use:  None


Drug Use:  None


admitted 4/9-4/13 for cough, CAP and new facial parethesias/stroke eval. COVID 

pcr, influenza A/B were negative.











FHX COPD


Cardiovascular:  AFIB, HTN


Pulmonary:  COPD


Endocrine:  Diabetes





Past Surgical History


Past Surgical History:  No pertinent history





Family History


Family History:  Hypertension





Social History


Smoke:  Quit


ALCOHOL:  none


Drugs:  None





Current Problem List


Problem List


Problems


Medical Problems:


(1) Retroperitoneal abscess


Status: Acute  





(2) Retroperitoneal hematoma


Status: Acute  





(3) Supratherapeutic INR


Status: Acute  











Current Medications


Current Medications





Current Medications


Ondansetron HCl (Zofran) 4 mg 1X  ONCE IVP  Last administered on 5/19/20at 

06:18;  Start 5/19/20 at 06:00;  Stop 5/19/20 at 06:01;  Status DC


Famotidine (Pepcid Vial) 20 mg 1X  ONCE IVP  Last administered on 5/19/20at 

06:18;  Start 5/19/20 at 06:00;  Stop 5/19/20 at 06:01;  Status DC


Sodium Chloride 1,000 ml @  1,000 mls/hr 1X  ONCE IV  Last administered on 

5/19/20at 06:19;  Start 5/19/20 at 06:00;  Stop 5/19/20 at 06:59;  Status DC


Iohexol (Omnipaque 300 Mg/ml) 75 ml 1X  ONCE IV  Last administered on 5/19/20at 

07:14;  Start 5/19/20 at 06:45;  Stop 5/19/20 at 06:46;  Status DC


Info (CONTRAST GIVEN -- Rx MONITORING) 1 each PRN DAILY  PRN MC SEE COMMENTS;  

Start 5/19/20 at 06:45;  Stop 5/21/20 at 06:44


Piperacillin Sod/ Tazobactam Sod 4.5 gm/Sodium Chloride 100 ml @  200 mls/hr 1X 

ONCE IV  Last administered on 5/19/20at 09:02;  Start 5/19/20 at 07:45;  Stop 

5/19/20 at 08:14;  Status DC


Vancomycin HCl 1.75 gm/Sodium Chloride 500 ml @  250 mls/hr 1X IV ;  Start 

5/19/20 at 07:41


Ondansetron HCl (Zofran) 4 mg 1X  ONCE IVP  Last administered on 5/19/20at 

08:08;  Start 5/19/20 at 07:45;  Stop 5/19/20 at 07:53;  Status DC


Phytonadione 10 mg/Dextrose 51 ml @  102 mls/hr 1X  ONCE IV  Last administered 

on 5/19/20at 09:41;  Start 5/19/20 at 09:15;  Stop 5/19/20 at 09:44;  Status DC


Metoclopramide HCl (Reglan Vial) 10 mg 1X  ONCE IVP  Last administered on 

5/19/20at 09:38;  Start 5/19/20 at 09:45;  Stop 5/19/20 at 09:46;  Status DC





Active Scripts


Active


Januvia (Sitagliptin Phosphate) 50 Mg Tablet 1 Tab PO DAILY


Amox Tr-K Clv 500-125 Mg Tab (Amoxicillin/Potassium Clav) 1 Each Tablet 1 Tab PO

BID 5 Days


Reported


Alendronate Sodium 70 Mg Tablet 1 Tab PO WEEKLY


Diphenhydramine Hcl 25 Mg Tablet 25 Mg PO PRN Q6-8HRS PRN


Simvastatin 10 Mg Tablet 1 Tab PO QHS


Warfarin Sodium 6 Mg Tablet 6 Mg PO DAILY


Calcium 600 + Vit D3 Tablet (Calcium Carbonate/Vitamin D3) 1 Each Tablet 1 Tab 

PO DAILY 30 Days


Losartan Potassium 50 Mg Tablet 25 Mg PO DAILY


Furosemide 40 Mg Tablet 1 Tab PO DAILY


Klor-Con M10 (Potassium Chloride) 10 Meq Tab.er.prt 10 Meq PO DAILY





Allergies


Allergies:  


Coded Allergies:  


     phenazopyridine (Verified  Allergy, Intermediate, 5/24/16)


     strawberry (Verified  Allergy, Intermediate, hives, 5/9/20)





ROS


General:  YES: Chills, Fatigue


ALLERGY AND IMMUNOLOGY:  YES: Hives


Hematological and Lymphatic:  YES: Bleeding Problems


ENDOCRINE:  YES: Malaise/lethargy


Respiratory:  YES: Cough, Shortness of breath, SOB with excertion


Gastrointestinal:  Yes Nausea, Yes Abdominal Pain


Genitourinary:  YES Dysuria, YES Retention


Musculoskeletal:  Yes Gait Disturbance, Yes Joint Stiffness


Neurological:  No Behavorial Changes, No Bowel/Bladder ControlChng, No 

Confusion, No Dizziness, No Gait Disturbance, No Headaches, No Impaired 

Coord/balance, No Memory Loss, No Numbness/Tingling, No Seizures, No Speech 

Problems, No Tremors, No Visual Changes, No Weakness, No Other


Skin:  No Dry Skin, No Eczema, No Hair Changes, No Lumps, No Mole Changes, No 

Mottling, No Nail Changes, No Pruritus, No Rash, No Skin Lesion Changes, No 

Other, No Acne





Physical Exam


Physical Exam





MODERATE DISTRESS LYING LEFT SIDE 


HENT: Normocephalic, atraumatic, bilateral external ears normal, oropharynx 

moist, no oral exudates, nose normal. []


Eyes: PERRLA, EOMI, conjunctiva normal, no discharge. [] 


Neck: Normal range of motion, no tenderness, supple, no stridor. [] 


Cardiovascular:Heart rate regular rhythm, no murmur []


Lungs & Thorax:  Bilateral breath sounds clear to auscultation []


Abdomen: OBESE  no pulsatile masses. [] 


Skin: Warm, dry, no erythema, no rash. [] 


Back: No tenderness, no CVA tenderness. [] 


Extremities: No tenderness, no cyanosis, no clubbing, ROM intact, no edema. [] 


Neurologic: Alert and oriented X 3,


General:  moderate distress





Vitals


Vitals





Vital Signs








  Date Time  Temp Pulse Resp B/P (MAP) Pulse Ox O2 Delivery O2 Flow Rate FiO2


 


5/19/20 09:15  84  146/63 (90) 94 Nasal Cannula 2.0 


 


5/19/20 07:16   16     


 


5/19/20 05:58 98.3       





 98.3       











Labs


Labs





Laboratory Tests








Test


 5/19/20


05:35 5/19/20


06:05 5/19/20


06:15 5/19/20


07:50


 


White Blood Count


 12.6 x10^3/uL


(4.0-11.0) 


 


 





 


Red Blood Count


 3.79 x10^6/uL


(3.50-5.40) 


 


 





 


Hemoglobin


 10.7 g/dL


(12.0-15.5) 


 


 





 


Hematocrit


 32.7 %


(36.0-47.0) 


 


 





 


Mean Corpuscular Volume 86 fL ()    


 


Mean Corpuscular Hemoglobin 28 pg (25-35)    


 


Mean Corpuscular Hemoglobin


Concent 33 g/dL


(31-37) 


 


 





 


Red Cell Distribution Width


 20.1 %


(11.5-14.5) 


 


 





 


Platelet Count


 299 x10^3/uL


(140-400) 


 


 





 


Neutrophils (%) (Auto) 85 % (31-73)    


 


Lymphocytes (%) (Auto) 6 % (24-48)    


 


Monocytes (%) (Auto) 8 % (0-9)    


 


Eosinophils (%) (Auto) 1 % (0-3)    


 


Basophils (%) (Auto) 0 % (0-3)    


 


Neutrophils # (Auto)


 10.8 x10^3/uL


(1.8-7.7) 


 


 





 


Lymphocytes # (Auto)


 0.8 x10^3/uL


(1.0-4.8) 


 


 





 


Monocytes # (Auto)


 1.0 x10^3/uL


(0.0-1.1) 


 


 





 


Eosinophils # (Auto)


 0.1 x10^3/uL


(0.0-0.7) 


 


 





 


Basophils # (Auto)


 0.0 x10^3/uL


(0.0-0.2) 


 


 





 


Segmented Neutrophils % 89 % (35-66)    


 


Lymphocytes % 3 % (24-48)    


 


Monocytes % 8 % (0-10)    


 


Platelet Estimate


 Adequate


(ADEQUATE) 


 


 





 


Polychromasia Slight    


 


Anisocytosis Mod    


 


Tear Drop Cells Occ    


 


Sodium Level


 137 mmol/L


(136-145) 


 


 





 


Potassium Level


 4.1 mmol/L


(3.5-5.1) 


 


 





 


Chloride Level


 100 mmol/L


() 


 


 





 


Carbon Dioxide Level


 34 mmol/L


(21-32) 


 


 





 


Anion Gap 3 (6-14)    


 


Blood Urea Nitrogen


 17 mg/dL


(7-20) 


 


 





 


Creatinine


 0.9 mg/dL


(0.6-1.0) 


 


 





 


Estimated GFR


(Cockcroft-Gault) 60.2 


 


 


 





 


BUN/Creatinine Ratio 19 (6-20)    


 


Glucose Level


 165 mg/dL


(70-99) 


 


 





 


Calcium Level


 10.2 mg/dL


(8.5-10.1) 


 


 





 


Magnesium Level


 1.8 mg/dL


(1.8-2.4) 


 


 





 


Total Bilirubin


 0.6 mg/dL


(0.2-1.0) 


 


 





 


Aspartate Amino Transf


(AST/SGOT) 16 U/L (15-37) 


 


 


 





 


Alanine Aminotransferase


(ALT/SGPT) 8 U/L (14-59) 


 


 


 





 


Alkaline Phosphatase


 82 U/L


() 


 


 





 


Creatine Kinase


 24 U/L


() 


 


 





 


Creatine Kinase MB (Mass)


 < 0.5 ng/mL


(0.0-3.6) 


 


 





 


Creatine Kinase MB Relative


Index  % (0-4) 


 


 


 





 


Troponin I Quantitative


 < 0.017 ng/mL


(0.000-0.055) 


 


 





 


Total Protein


 6.1 g/dL


(6.4-8.2) 


 


 





 


Albumin


 2.1 g/dL


(3.4-5.0) 


 


 





 


Albumin/Globulin Ratio 0.5 (1.0-1.7)    


 


Lipase


 37 U/L


() 


 


 





 


Urine Collection Type  Unknown   


 


Urine Color  Yellow   


 


Urine Clarity  Clear   


 


Urine pH  6.0 (<5.0-8.0)   


 


Urine Specific Gravity


 


 1.020


(1.000-1.030) 


 





 


Urine Protein


 


 Negative mg/dL


(NEG-TRACE) 


 





 


Urine Glucose (UA)


 


 Negative mg/dL


(NEG) 


 





 


Urine Ketones (Stick)


 


 Negative mg/dL


(NEG) 


 





 


Urine Blood  Moderate (NEG)   


 


Urine Nitrite  Negative (NEG)   


 


Urine Bilirubin  Negative (NEG)   


 


Urine Urobilinogen Dipstick


 


 1.0 mg/dL (0.2


mg/dL) 


 





 


Urine Leukocyte Esterase  Moderate (NEG)   


 


Urine RBC


 


 20-40 /HPF


(0-2) 


 





 


Urine WBC


 


 11-20 /HPF


(0-4) 


 





 


Urine Squamous Epithelial


Cells 


 Mod /LPF 


 


 





 


Urine Bacteria


 


 Moderate /HPF


(0-FEW) 


 





 


Urine Mucus  Mod /LPF   


 


Urine Yeast  Present /HPF   


 


Lactic Acid Level


 


 


 1.6 mmol/L


(0.4-2.0) 0.9 mmol/L


(0.4-2.0)


 


Prothrombin Time


 


 


 


 > 120.0 SEC


(11.7-14.0)


 


Prothromb Time International


Ratio 


 


 


 > 15.0


(0.8-1.1)


 


Activated Partial


Thromboplast Time 


 


 


 94 SEC (24-38) 





 


Test


 5/19/20


08:15 


 


 





 


Glucose (Fingerstick)


 188 mg/dL


(70-99) 


 


 











Laboratory Tests








Test


 5/19/20


05:35 5/19/20


06:05 5/19/20


06:15 5/19/20


07:50


 


White Blood Count


 12.6 x10^3/uL


(4.0-11.0) 


 


 





 


Red Blood Count


 3.79 x10^6/uL


(3.50-5.40) 


 


 





 


Hemoglobin


 10.7 g/dL


(12.0-15.5) 


 


 





 


Hematocrit


 32.7 %


(36.0-47.0) 


 


 





 


Mean Corpuscular Volume 86 fL ()    


 


Mean Corpuscular Hemoglobin 28 pg (25-35)    


 


Mean Corpuscular Hemoglobin


Concent 33 g/dL


(31-37) 


 


 





 


Red Cell Distribution Width


 20.1 %


(11.5-14.5) 


 


 





 


Platelet Count


 299 x10^3/uL


(140-400) 


 


 





 


Neutrophils (%) (Auto) 85 % (31-73)    


 


Lymphocytes (%) (Auto) 6 % (24-48)    


 


Monocytes (%) (Auto) 8 % (0-9)    


 


Eosinophils (%) (Auto) 1 % (0-3)    


 


Basophils (%) (Auto) 0 % (0-3)    


 


Neutrophils # (Auto)


 10.8 x10^3/uL


(1.8-7.7) 


 


 





 


Lymphocytes # (Auto)


 0.8 x10^3/uL


(1.0-4.8) 


 


 





 


Monocytes # (Auto)


 1.0 x10^3/uL


(0.0-1.1) 


 


 





 


Eosinophils # (Auto)


 0.1 x10^3/uL


(0.0-0.7) 


 


 





 


Basophils # (Auto)


 0.0 x10^3/uL


(0.0-0.2) 


 


 





 


Segmented Neutrophils % 89 % (35-66)    


 


Lymphocytes % 3 % (24-48)    


 


Monocytes % 8 % (0-10)    


 


Platelet Estimate


 Adequate


(ADEQUATE) 


 


 





 


Polychromasia Slight    


 


Anisocytosis Mod    


 


Tear Drop Cells Occ    


 


Sodium Level


 137 mmol/L


(136-145) 


 


 





 


Potassium Level


 4.1 mmol/L


(3.5-5.1) 


 


 





 


Chloride Level


 100 mmol/L


() 


 


 





 


Carbon Dioxide Level


 34 mmol/L


(21-32) 


 


 





 


Anion Gap 3 (6-14)    


 


Blood Urea Nitrogen


 17 mg/dL


(7-20) 


 


 





 


Creatinine


 0.9 mg/dL


(0.6-1.0) 


 


 





 


Estimated GFR


(Cockcroft-Gault) 60.2 


 


 


 





 


BUN/Creatinine Ratio 19 (6-20)    


 


Glucose Level


 165 mg/dL


(70-99) 


 


 





 


Calcium Level


 10.2 mg/dL


(8.5-10.1) 


 


 





 


Magnesium Level


 1.8 mg/dL


(1.8-2.4) 


 


 





 


Total Bilirubin


 0.6 mg/dL


(0.2-1.0) 


 


 





 


Aspartate Amino Transf


(AST/SGOT) 16 U/L (15-37) 


 


 


 





 


Alanine Aminotransferase


(ALT/SGPT) 8 U/L (14-59) 


 


 


 





 


Alkaline Phosphatase


 82 U/L


() 


 


 





 


Creatine Kinase


 24 U/L


() 


 


 





 


Creatine Kinase MB (Mass)


 < 0.5 ng/mL


(0.0-3.6) 


 


 





 


Creatine Kinase MB Relative


Index  % (0-4) 


 


 


 





 


Troponin I Quantitative


 < 0.017 ng/mL


(0.000-0.055) 


 


 





 


Total Protein


 6.1 g/dL


(6.4-8.2) 


 


 





 


Albumin


 2.1 g/dL


(3.4-5.0) 


 


 





 


Albumin/Globulin Ratio 0.5 (1.0-1.7)    


 


Lipase


 37 U/L


() 


 


 





 


Urine Collection Type  Unknown   


 


Urine Color  Yellow   


 


Urine Clarity  Clear   


 


Urine pH  6.0 (<5.0-8.0)   


 


Urine Specific Gravity


 


 1.020


(1.000-1.030) 


 





 


Urine Protein


 


 Negative mg/dL


(NEG-TRACE) 


 





 


Urine Glucose (UA)


 


 Negative mg/dL


(NEG) 


 





 


Urine Ketones (Stick)


 


 Negative mg/dL


(NEG) 


 





 


Urine Blood  Moderate (NEG)   


 


Urine Nitrite  Negative (NEG)   


 


Urine Bilirubin  Negative (NEG)   


 


Urine Urobilinogen Dipstick


 


 1.0 mg/dL (0.2


mg/dL) 


 





 


Urine Leukocyte Esterase  Moderate (NEG)   


 


Urine RBC


 


 20-40 /HPF


(0-2) 


 





 


Urine WBC


 


 11-20 /HPF


(0-4) 


 





 


Urine Squamous Epithelial


Cells 


 Mod /LPF 


 


 





 


Urine Bacteria


 


 Moderate /HPF


(0-FEW) 


 





 


Urine Mucus  Mod /LPF   


 


Urine Yeast  Present /HPF   


 


Lactic Acid Level


 


 


 1.6 mmol/L


(0.4-2.0) 0.9 mmol/L


(0.4-2.0)


 


Prothrombin Time


 


 


 


 > 120.0 SEC


(11.7-14.0)


 


Prothromb Time International


Ratio 


 


 


 > 15.0


(0.8-1.1)


 


Activated Partial


Thromboplast Time 


 


 


 94 SEC (24-38) 





 


Test


 5/19/20


08:15 


 


 





 


Glucose (Fingerstick)


 188 mg/dL


(70-99) 


 


 














Images


Images


 


One or more of the following individualized dose reduction techniques were


utilized for this examination:  


1. Automated exposure control  


2. Adjustment of the mA and/or kV according to patient size  


3. Use of iterative reconstruction technique


 


CT abdomen/pelvis with contrast 5/19/2020 5:38 AM


 


INDICATION: Abdominal pain, nausea, vomiting and diarrhea.


 


COMPARISON: CT abdomen and pelvis 4/29/2013


 


TECHNIQUE: Multiple axial CT images of the abdomen and pelvis were 


obtained after the intravenous administration of 75 mL Omnipaque 300. 


Coronal and sagittal reformats are provided.


 


FINDINGS:


 


Small left and trace right pleural effusions with adjacent consolidative 


change suggestive of compressive atelectasis or infiltrates. Heart size is


within normal limits. Liver is normal in appearance. Hypoattenuating 


lesion within the spleen measures 14 mm, new from the prior examination of


4/29/2013. Left adrenal nodule has increased in size measuring 2.1 x 1.9 


cm, previously measuring 1.9 x 1.5 cm. Right adrenal gland is normal. 


Pancreas is normal in appearance. Large lamellated calcified gallstones 


are present without adjacent plantar changes. Abdominal aorta is normal in


course and caliber. There is no free intraperitoneal air.


 


There is a large left retroperitoneal fluid collection measuring 15.0 x 


15.6 x 18.0 cm. There is shaggy rim enhancement. The fluid collection 


closely abuts the posterior cortex of the left kidney with mild focal 


irregularity involving the renal cortex along the posterior lateral 


interpolar left kidney (series 2, image 24). Adjacent fat stranding is 


noted in the perirenal fat. Collection extends inferiorly to the level of 


the left acetabulum. No definite evidence for active contrast 


extravasation. The renal hilum appears intact. Large staghorn calculi are 


identified within left renal pelvis. Left ureter is displaced 


anteromedially as is the left kidney. Right renal parapelvic cyst is 


present. No hydronephrosis is visualized. Urinary bladder is within normal


limits given degree of distention. Uterus is normal in appearance by CT. 


The left sella as muscle is inseparable from the left retroperitoneal 


fluid collection.


 


The left colon is displaced anteriorly. Moderate diverticulosis. No 


pericolonic inflammatory changes are present. Appendix is not definitively


visualized. No pericecal inflammatory changes are identified. No 


suspicious osseous abnormality is identified. Superior endplate concavity 


of L4 appears chronic. Bridging anterior marginal osteophytosis noted 


within the thoracolumbar spine suggestive of diffuse idiopathic skeletal 


hyperostosis. There is a minimally displaced posterolateral left 10th rib 


fracture.


 


IMPRESSION:


1. 15.0 x 15.6 x 18.0 cm left retroperitoneal fluid collection with shaggy


rim enhancement. Differential considerations include a liquefied hematoma,


possibly from left renal cortical injury given posterolateral left 10th 


rib fracture ( Grade II AAST injury, less than 1 cm laceration with 


possible hematoma localized within the perirenal fascia). Collection 


extends inferiorly to the level of the left acetabulum. Alternate 


consideration may be given for an abscess. These findings are not 


definitively associated with left renal staghorn calculus.


2. Left renal staghorn calculus. No hydronephrosis or CT evidence for 


xanthogranulomatous pyelonephritis.


3. Minimally displaced posterolateral left 10th rib fracture.


4. Trace right and small left pleural effusions with adjacent compressive 


atelectasis versus infiltrates.


5. Marginal increase in left adrenal nodule, indeterminate although 


favoring benign given only marginal increase since 2013. Adrenal mass 


protocol CT could be of benefit for further evaluation.


6. New indeterminate hypodensity within the spleen measures 14 mm. 


Six-month follow-up CT abdomen could be of benefit to assess stability as 


findings are indeterminate by CT. Primary consideration in the absence of 


underlying malignancy may be given for a hemangioma or lymphangioma versus


splenic cyst.


 


 


**********FOR INTERNAL CODING PURPOSES**********


 


Critical result:


 


Findings discussed with  Dr. Dunham at 5/19/2020 7:49 AM.


 


RESULT CODE: (C)  


 


 


 


 


 


Electronically signed by: Anat Rivera MD (5/19/2020 7:51 AM) 


[id number]














DICTATED and SIGNED BY:     ANAT RIVERA MD


DATE:     05/19/20 0751





VTE Prophylaxis Ordered


VTE Prophylaxis Devices:  Contraindicated


VTE Pharmacological Prophylaxi:  Contraindicated





Assessment/Plan


Assessment/Plan





 


IMPRESSION:








ACUTE 15.0 x 15.6 x 18.0 cm left retroperitoneal fluid collection with shaggy 

rim enhancement. CONSIDER  liquefied hematoma, possibly from left renal cortical

 injury given posterolateral left 10th 


rib fracture ( Grade II AAST injury, less than 1 cm laceration with  possible 

hematoma localized within the perirenal fascia). Collection extends inferiorly 

to the level of the left acetabulum. differential consideration //for an 

abscess. 


Chronic diastolic CHF


Left ureter is displaced  anteromedially as is the left kidney. Right renal 

parapelvic cyst is  present. No hydronephrosis is visualized. 


UTI


SEPSIS


A-Fib, HX ON WARFARIN 


SUPRATHERAPEUTIC INR 


Severe malnutrition, w. obesity,  BMI 34


COPD - apparently on home O2.


Diabetes-Type II


History of DVT


History of High Cholesterol


Essential hypertension








PLAN





Consider transfer to [facility]


ID CONSULT


GEN SURG CONSULT


IR CONSULT


FFP


NPO


ICU BED


BLOOD CULTURE


INR AT NOON 


EMPERIC IV ANTIBIOTICS


PULM CONSULT











D/W DR MACK  AND ER ,   /// AGREES WITH NEED TO TRANSFER TO [facility]  10:32 AM 








46 MIN CC TIME











ERUM RASHID MD          May 19, 2020 10:09

## 2020-05-19 NOTE — PHYS DOC
Past Medical History


Past Medical History:  A-Fib, COPD, Diabetes-Type II, DVT, High Cholesterol, 

Hypertension, Stroke


Past Surgical History:  Other


Additional Past Surgical Histo:  ovarian cyst surgery, cyst removal from neck


Smoking Status:  Former Smoker


Alcohol Use:  None


Drug Use:  None





General Adult


EDM:


Chief Complaint:  NAUSEA/VOMITING/DIARRHA





HPI:


HPI:





Patient is a 79  year old F past medical history of diabetes, obesity, 

hypertension, HLD, HRpEF, hyperlipidemia, COPD on 1.5L O2, atrial fibrillation 

and h/o DVT on Coumadin, with recent admission May 9, presents to the ED from 

home with complaints of " it is my COPD." States she's have sharp nonradiating 

LLQ abdominal pain that started around 4am, last meal 5pm last night.  Reports 

associated nausea, nonbloody nonbilious vomiting. When asked if she's had a 

fever, touches her forehead and states "I don't know." Pt very poor historian. 

Uses wheelchair/chair lifts.





EMR was reviewed-pt was admitted 4/9-4/13 for cough, CAP and new facial 

parethesias/stroke eval. COVID pcr, influenza A/B were negative.





Heart Score:


Risk Scores:


Score 0 - 3:  2.5% MACE over next 6 weeks - Discharge Home


Score 4 - 6:  20.3% MACE over next 6 weeks - Admit for Clinical Observation


Score 7 - 10:  72.7% MACE over next 6 weeks - Early Invasive Strategies





Current Medications:





Current Medications








 Medications


  (Trade)  Dose


 Ordered  Sig/Carmine  Start Time


 Stop Time Status Last Admin


Dose Admin


 


 Famotidine


  (Pepcid Vial)  20 mg  1X  ONCE  5/19/20 06:00


 5/19/20 06:01 DC 5/19/20 06:18


20 MG


 


 Ondansetron HCl


  (Zofran)  4 mg  1X  ONCE  5/19/20 06:00


 5/19/20 06:01 DC 5/19/20 06:18


4 MG


 


 Sodium Chloride  1,000 ml @ 


 1,000 mls/hr  1X  ONCE  5/19/20 06:00


 5/19/20 06:59  5/19/20 06:19


1,000 MLS/HR











Allergies:


Allergies:





Allergies








Coded Allergies Type Severity Reaction Last Updated Verified


 


  phenazopyridine Allergy Intermediate  5/24/16 Yes


 


  strawberry Allergy Intermediate hives 5/9/20 Yes











Physical Exam:


PE:





Constitutional: poor hygiene, rn removed bed bugs, no acute distress, non-toxic 

appearance. []


HENT: Normocephalic, atraumatic, bilateral external ears normal, oropharynx 

moist, no oral exudates, nose normal. []


Eyes: EOMI, conjunctiva normal, no discharge. no signs of head trauma


Neck: Normal range of motion, no tenderness, supple, no stridor. [] 


Cardiovascular:Heart rate regular rhythm, no murmur []


Lungs & Thorax:  Bilateral breath sounds clear to auscultation []


Abdomen: +LLQ pain - distended, Bowel sounds normal, no masses, no pulsatile 

masses. [] 


Skin: Warm, dry, no erythema, no rash. [] 


Back: No tenderness, no CVA tenderness. [] 


Extremities: No tenderness, no cyanosis, no clubbing, ROM intact, no edema. [] 


Neurologic: Alert and oriented X 3, normal motor function, normal sensory 

function, no focal deficits noted. []


Psychologic: Affect normal, judgement normal, mood normal. []





Current Patient Data:


Labs:





                                Laboratory Tests








Test


 5/19/20


05:35


 


White Blood Count


 12.6 x10^3/uL


(4.0-11.0)  H


 


Red Blood Count


 3.79 x10^6/uL


(3.50-5.40)


 


Hemoglobin


 10.7 g/dL


(12.0-15.5)  L


 


Hematocrit


 32.7 %


(36.0-47.0)  L


 


Mean Corpuscular Volume


 86 fL ()





 


Mean Corpuscular Hemoglobin 28 pg (25-35)  


 


Mean Corpuscular Hemoglobin


Concent 33 g/dL


(31-37)


 


Red Cell Distribution Width


 20.1 %


(11.5-14.5)  H


 


Platelet Count


 299 x10^3/uL


(140-400)


 


Neutrophils (%) (Auto) 85 % (31-73)  H


 


Lymphocytes (%) (Auto) 6 % (24-48)  L


 


Monocytes (%) (Auto) 8 % (0-9)  


 


Eosinophils (%) (Auto) 1 % (0-3)  


 


Basophils (%) (Auto) 0 % (0-3)  


 


Neutrophils # (Auto)


 10.8 x10^3/uL


(1.8-7.7)  H


 


Lymphocytes # (Auto)


 0.8 x10^3/uL


(1.0-4.8)  L


 


Monocytes # (Auto)


 1.0 x10^3/uL


(0.0-1.1)


 


Eosinophils # (Auto)


 0.1 x10^3/uL


(0.0-0.7)


 


Basophils # (Auto)


 0.0 x10^3/uL


(0.0-0.2)


 


Platelet Estimate Pending  


 


Sodium Level


 137 mmol/L


(136-145)


 


Potassium Level


 4.1 mmol/L


(3.5-5.1)


 


Chloride Level


 100 mmol/L


()


 


Carbon Dioxide Level


 34 mmol/L


(21-32)  H


 


Anion Gap 3 (6-14)  L


 


Blood Urea Nitrogen


 17 mg/dL


(7-20)


 


Creatinine


 0.9 mg/dL


(0.6-1.0)


 


Estimated GFR


(Cockcroft-Gault) 60.2  





 


BUN/Creatinine Ratio 19 (6-20)  


 


Glucose Level


 165 mg/dL


(70-99)  H


 


Calcium Level


 10.2 mg/dL


(8.5-10.1)  H


 


Magnesium Level Pending  


 


Total Bilirubin Pending  


 


Aspartate Amino Transferase


(AST) Pending  





 


Alanine Aminotransferase (ALT) Pending  


 


Alkaline Phosphatase Pending  


 


Total Protein Pending  


 


Albumin Pending  


 


Albumin/Globulin Ratio Pending  


 


Lipase Pending  





                                Laboratory Tests


5/19/20 05:35








                                Laboratory Tests


5/19/20 05:35








Vital Signs:





                                   Vital Signs








  Date Time  Temp Pulse Resp B/P (MAP) Pulse Ox O2 Delivery O2 Flow Rate FiO2


 


5/19/20 05:58 98.3 88 20 168/78 (108) 96 Nasal Cannula 2.0 





 98.3       











EKG:


EKG:


Sinus rhythm at 96 bpm, extreme right axis deviation T wave inversion 1 and aVL,

 no ST elevations or ST depressions





Radiology/Procedures:


Impression:


Impression: Patient presented to the ED with complaints of left lower quadrant 

abdominal pain but denied any falls or traumas.  I spoke with radiologist who is

 concerned for very large retroperitoneal hemorrhage versus abscess.  Patient 

was started on broad-spectrum antibiotics.  Patient is accepted by Dr. Lewis

 to the ICU.  INR reported after I gave report was above 15.  Vitamin K and FFP 

were started ([facility] would take 2 hours to get from another hospital).  CT of the 

head and cervical spine without contrast are pending -will need repeat in 12 

hours given recent IV contrast for CT abd. patient is a very poor historian, 

presented to the ED with bedbugs.  I do not feel the patient is able to care for

 herself will likely need long-term placement.





Course & Med Decision Making:


Course & Med Decision Making


Pertinent Labs and Imaging studies reviewed. (See chart for details)





Critical care time was 60 minutes which includes time at bedside, spent in 

discussion of patient's care with specialist and/or family members, with 

interpretation of laboratory and/or radiological studies and is exclusive of 

procedures.





[]





Dragon Disclaimer:


Dragon Disclaimer:


This electronic medical record was generated, in whole or in part, using a voice

 recognition dictation system.





Departure


Departure


Impression:  


   Primary Impression:  


   Retroperitoneal hematoma


   Additional Impression:  


   Supratherapeutic INR


Disposition:  09 ADMITTED AS INPATIENT


Admitting Physician:  HIMS


Condition:  CRITICAL


Referrals:  


CHANDA DIXON MD (PCP)











ELIEZER PARSON DO               May 19, 2020 06:28

## 2020-05-19 NOTE — RAD
PQRS Compliance Statement:

 

One or more of the following individualized dose reduction techniques were

utilized for this examination:  

1. Automated exposure control  

2. Adjustment of the mA and/or kV according to patient size  

3. Use of iterative reconstruction technique

 

CT abdomen/pelvis with contrast 5/19/2020 5:38 AM

 

INDICATION: Abdominal pain, nausea, vomiting and diarrhea.

 

COMPARISON: CT abdomen and pelvis 4/29/2013

 

TECHNIQUE: Multiple axial CT images of the abdomen and pelvis were 

obtained after the intravenous administration of 75 mL Omnipaque 300. 

Coronal and sagittal reformats are provided.

 

FINDINGS:

 

Small left and trace right pleural effusions with adjacent consolidative 

change suggestive of compressive atelectasis or infiltrates. Heart size is

within normal limits. Liver is normal in appearance. Hypoattenuating 

lesion within the spleen measures 14 mm, new from the prior examination of

4/29/2013. Left adrenal nodule has increased in size measuring 2.1 x 1.9 

cm, previously measuring 1.9 x 1.5 cm. Right adrenal gland is normal. 

Pancreas is normal in appearance. Large lamellated calcified gallstones 

are present without adjacent plantar changes. Abdominal aorta is normal in

course and caliber. There is no free intraperitoneal air.

 

There is a large left retroperitoneal fluid collection measuring 15.0 x 

15.6 x 18.0 cm. There is shaggy rim enhancement. The fluid collection 

closely abuts the posterior cortex of the left kidney with mild focal 

irregularity involving the renal cortex along the posterior lateral 

interpolar left kidney (series 2, image 24). Adjacent fat stranding is 

noted in the perirenal fat. Collection extends inferiorly to the level of 

the left acetabulum. No definite evidence for active contrast 

extravasation. The renal hilum appears intact. Large staghorn calculi are 

identified within left renal pelvis. Left ureter is displaced 

anteromedially as is the left kidney. Right renal parapelvic cyst is 

present. No hydronephrosis is visualized. Urinary bladder is within normal

limits given degree of distention. Uterus is normal in appearance by CT. 

The left sella as muscle is inseparable from the left retroperitoneal 

fluid collection.

 

The left colon is displaced anteriorly. Moderate diverticulosis. No 

pericolonic inflammatory changes are present. Appendix is not definitively

visualized. No pericecal inflammatory changes are identified. No 

suspicious osseous abnormality is identified. Superior endplate concavity 

of L4 appears chronic. Bridging anterior marginal osteophytosis noted 

within the thoracolumbar spine suggestive of diffuse idiopathic skeletal 

hyperostosis. There is a minimally displaced posterolateral left 10th rib 

fracture.

 

IMPRESSION:

1. 15.0 x 15.6 x 18.0 cm left retroperitoneal fluid collection with shaggy

rim enhancement. Differential considerations include a liquefied hematoma,

possibly from left renal cortical injury given posterolateral left 10th 

rib fracture ( Grade II AAST injury, less than 1 cm laceration with 

possible hematoma localized within the perirenal fascia). Collection 

extends inferiorly to the level of the left acetabulum. Alternate 

consideration may be given for an abscess. These findings are not 

definitively associated with left renal staghorn calculus.

2. Left renal staghorn calculus. No hydronephrosis or CT evidence for 

xanthogranulomatous pyelonephritis.

3. Minimally displaced posterolateral left 10th rib fracture.

4. Trace right and small left pleural effusions with adjacent compressive 

atelectasis versus infiltrates.

5. Marginal increase in left adrenal nodule, indeterminate although 

favoring benign given only marginal increase since 2013. Adrenal mass 

protocol CT could be of benefit for further evaluation.

6. New indeterminate hypodensity within the spleen measures 14 mm. 

Six-month follow-up CT abdomen could be of benefit to assess stability as 

findings are indeterminate by CT. Primary consideration in the absence of 

underlying malignancy may be given for a hemangioma or lymphangioma versus

splenic cyst.

 

 

**********FOR INTERNAL CODING PURPOSES**********

 

Critical result:

 

Findings discussed with  Dr. Dunham at 5/19/2020 7:49 AM.

 

RESULT CODE: (C)  

 

 

 

 

 

Electronically signed by: Chelsie Salter MD (5/19/2020 7:51 AM) 

[id number]

## 2020-05-19 NOTE — RAD
CT head without contrast. CT cervical spine without contrast.

 

PQRS statement: CT scans at this facility use dose reduction including 

either automated exposure control, iterative reconstructions, and /or 

weight based radiation dosing via mA and kV modification when appropriate 

to reduce radiation dose to as low as reasonably achievable.

 

HISTORY: Trauma, bed bug infestation.

 

COMPARISON: CT head May 12, 2020.

 

CT head findings: Small chronic left cerebellar ischemic infarct stable. 

Dolichoectasia and calcified plaque intracranial arteries stable. Cerebral

white matter heterogeneous hypoattenuation is stable most likely 

represents sequela of chronic small vessel ischemic disease. Small chronic

right caudate nucleus ischemic infarct. No new infarct or acute ischemic 

change since prior imaging. No intracranial hemorrhage, mass or 

hydrocephalus. Orbits, mastoids, paranasal sinuses and bones are 

unremarkable.

 

IMPRESSION: No acute abnormality. Stable exam.

 

 

 

CT cervical spine findings: Craniocervical junction intact. Cervical 

vertebral body height and alignment intact. Left submandibular gland 

subcentimeter sialolith. No fracture of the cervical spine. Multilevel 

neural foraminal stenoses due to uncovertebral and facet spurring. There 

are scattered soft disc bulges and protrusions may contribute to at least 

mild spinal canal stenoses particularly at C3-C4 and C4-C5. Lung apices 

and paraspinal tissues are unremarkable. Multinodular thyroid with several

indistinct nodules which may measure greater than 1 cm in size and 

calcifications, consider further assessment with outpatient sonography.

 

IMPRESSION:

1. No acute osseous injury of the cervical spine.

 

2. Left submandibular sialolith.

 

3. Cervical disc disease and arthritis.

 

4. Multinodular thyroid as described above.

 

Electronically signed by: Harish Robles MD (5/19/2020 10:19 AM) 

[facility]

## 2020-05-19 NOTE — EKG
[facility]

              [address] [place] [address]

Test Date:    2020               Test Time:    08:23:56

Pat Name:     PRIYA CHAVIRA          Department:   

Patient ID:   [id number]           Room:         264 1

Gender:       F                        Technician:   

:          [id number]               Requested By: ERUM RASHID

Order Number: [id number]           Reading MD:   Tani Holbrook

                                 Measurements

Intervals                              Axis          

Rate:         91                       P:            90

OH:           198                      QRS:          -40

QRSD:         114                      T:            59

QT:           350                                    

QTc:          432                                    

                           Interpretive Statements

SINUS RHYTHM

ABNORMAL LEFT AXIS DEVIATION

LEFT ANTERIOR FASCICULAR BLOCK

LVH WITH REPOLARIZATION ABNORMALITY

QRS(T) CONTOUR ABNORMALITY

CONSIDER ANTEROSEPTAL MYOCARDIAL DAMAGE

ABNORMAL ECG



Electronically Signed On 2020 8:03:10 CDT by Tani Holbrook

## 2020-05-19 NOTE — PDOC3
Discharge Summary


Date of Admission:  May 19, 2020


Date of Discharge:  May 19, 2020


Follow-Up:  1-2 days


Admitting Diagnosis comment:


DISCHARGE DX =================





Assessment/Plan





 


IMPRESSION:








ACUTE 15.0 x 15.6 x 18.0 cm left retroperitoneal fluid collection with shaggy 

rim enhancement. CONSIDER  liquefied hematoma, possibly from left renal cortical

injury given posterolateral left 10th 


rib fracture ( Grade II AAST injury, less than 1 cm laceration with  possible 

hematoma localized within the perirenal fascia). Collection extends inferiorly 

to the level of the left acetabulum. differential consideration //for an 

abscess. 


Chronic diastolic CHF


Left ureter is displaced  anteromedially as is the left kidney. Right renal 

parapelvic cyst is  present. No hydronephrosis is visualized. 


UTI


SEPSIS


A-Fib, HX ON WARFARIN 


SUPRATHERAPEUTIC INR 


Severe malnutrition, w. obesity,  BMI 34


COPD - apparently on home O2.


Diabetes-Type II


History of DVT


History of High Cholesterol


Essential hypertension








PLAN








transfer to [facility] ASAP, NEEDS UROLOGY CONSULTATION


ID CONSULT


GEN SURG CONSULT


IR CONSULT


FFP


NPO


ICU BED


BLOOD CULTURE


INR AT [facility] 


EMPERIC IV ANTIBIOTICS


PULM CONSULT











D/W DR MACK  AND ER DR,   /// AGREES WITH NEED TO TRANSFER TO [facility]  10:32 AM 








46 MIN CC TIME


FINAL DIAGNOSIS


Problems


Medical Problems:


(1) Retroperitoneal abscess


Status: Acute  





(2) Retroperitoneal hematoma


Status: Acute  





(3) Supratherapeutic INR


Status: Acute  








Brief Hospital Course


Ms. Bazan  is a 79 old [sex] who presented with [ ACUTE RETROPERITONEAL MASS ,

 UTI, SUPRATHERAPEUTIC INR ]


CONDITION AT DISCHARGE:  Stable


Discharge Medications





Current Medications


Ondansetron HCl (Zofran) 4 mg 1X  ONCE IVP  Last administered on 5/19/20at 

06:18;  Start 5/19/20 at 06:00;  Stop 5/19/20 at 06:01;  Status DC


Famotidine (Pepcid Vial) 20 mg 1X  ONCE IVP  Last administered on 5/19/20at 

06:18;  Start 5/19/20 at 06:00;  Stop 5/19/20 at 06:01;  Status DC


Sodium Chloride 1,000 ml @  1,000 mls/hr 1X  ONCE IV  Last administered on 

5/19/20at 06:19;  Start 5/19/20 at 06:00;  Stop 5/19/20 at 06:59;  Status DC


Iohexol (Omnipaque 300 Mg/ml) 75 ml 1X  ONCE IV  Last administered on 5/19/20at 

07:14;  Start 5/19/20 at 06:45;  Stop 5/19/20 at 06:46;  Status DC


Info (CONTRAST GIVEN -- Rx MONITORING) 1 each PRN DAILY  PRN MC SEE COMMENTS;  

Start 5/19/20 at 06:45;  Stop 5/19/20 at 17:39;  Status DC


Piperacillin Sod/ Tazobactam Sod 4.5 gm/Sodium Chloride 100 ml @  200 mls/hr 1X 

ONCE IV  Last administered on 5/19/20at 09:02;  Start 5/19/20 at 07:45;  Stop 

5/19/20 at 08:14;  Status DC


Vancomycin HCl 1.75 gm/Sodium Chloride 500 ml @  250 mls/hr 1X IV ;  Start 

5/19/20 at 07:41;  Stop 5/19/20 at 17:39;  Status DC


Ondansetron HCl (Zofran) 4 mg 1X  ONCE IVP  Last administered on 5/19/20at 

08:08;  Start 5/19/20 at 07:45;  Stop 5/19/20 at 07:53;  Status DC


Phytonadione 10 mg/Dextrose 51 ml @  102 mls/hr 1X  ONCE IV  Last administered 

on 5/19/20at 09:41;  Start 5/19/20 at 09:15;  Stop 5/19/20 at 09:44;  Status DC


Metoclopramide HCl (Reglan Vial) 10 mg 1X  ONCE IVP  Last administered on 5/ 19/20at 09:38;  Start 5/19/20 at 09:45;  Stop 5/19/20 at 09:46;  Status DC





Active Scripts


Active


Januvia (Sitagliptin Phosphate) 50 Mg Tablet 1 Tab PO DAILY


Reported


Alendronate Sodium 70 Mg Tablet 1 Tab PO WEEKLY


Diphenhydramine Hcl 25 Mg Tablet 25 Mg PO PRN Q6-8HRS PRN


Simvastatin 10 Mg Tablet 1 Tab PO QHS


Warfarin Sodium 6 Mg Tablet 6 Mg PO DAILY


Calcium 600 + Vit D3 Tablet (Calcium Carbonate/Vitamin D3) 1 Each Tablet 1 Tab 

PO DAILY 30 Days


Losartan Potassium 50 Mg Tablet 25 Mg PO DAILY


Furosemide 40 Mg Tablet 1 Tab PO DAILY


Klor-Con M10 (Potassium Chloride) 10 Meq Tab.er.prt 10 Meq PO DAILY


Vital Signs





Vital Signs








  Date Time  Temp Pulse Resp B/P (MAP) Pulse Ox O2 Delivery O2 Flow Rate FiO2


 


5/19/20 16:25  79 16 122/61 (81) 99 Nasal Cannula 2.0 


 


5/19/20 12:21 98.4       





 98.4       








Labs





Laboratory Tests








Test


 5/19/20


05:35 5/19/20


06:05 5/19/20


06:15 5/19/20


07:50


 


White Blood Count


 12.6 x10^3/uL


(4.0-11.0) 


 


 





 


Red Blood Count


 3.79 x10^6/uL


(3.50-5.40) 


 


 





 


Hemoglobin


 10.7 g/dL


(12.0-15.5) 


 


 





 


Hematocrit


 32.7 %


(36.0-47.0) 


 


 





 


Mean Corpuscular Volume 86 fL ()    


 


Mean Corpuscular Hemoglobin 28 pg (25-35)    


 


Mean Corpuscular Hemoglobin


Concent 33 g/dL


(31-37) 


 


 





 


Red Cell Distribution Width


 20.1 %


(11.5-14.5) 


 


 





 


Platelet Count


 299 x10^3/uL


(140-400) 


 


 





 


Neutrophils (%) (Auto) 85 % (31-73)    


 


Lymphocytes (%) (Auto) 6 % (24-48)    


 


Monocytes (%) (Auto) 8 % (0-9)    


 


Eosinophils (%) (Auto) 1 % (0-3)    


 


Basophils (%) (Auto) 0 % (0-3)    


 


Neutrophils # (Auto)


 10.8 x10^3/uL


(1.8-7.7) 


 


 





 


Lymphocytes # (Auto)


 0.8 x10^3/uL


(1.0-4.8) 


 


 





 


Monocytes # (Auto)


 1.0 x10^3/uL


(0.0-1.1) 


 


 





 


Eosinophils # (Auto)


 0.1 x10^3/uL


(0.0-0.7) 


 


 





 


Basophils # (Auto)


 0.0 x10^3/uL


(0.0-0.2) 


 


 





 


Segmented Neutrophils % 89 % (35-66)    


 


Lymphocytes % 3 % (24-48)    


 


Monocytes % 8 % (0-10)    


 


Platelet Estimate


 Adequate


(ADEQUATE) 


 


 





 


Polychromasia Slight    


 


Anisocytosis Mod    


 


Tear Drop Cells Occ    


 


Sodium Level


 137 mmol/L


(136-145) 


 


 





 


Potassium Level


 4.1 mmol/L


(3.5-5.1) 


 


 





 


Chloride Level


 100 mmol/L


() 


 


 





 


Carbon Dioxide Level


 34 mmol/L


(21-32) 


 


 





 


Anion Gap 3 (6-14)    


 


Blood Urea Nitrogen


 17 mg/dL


(7-20) 


 


 





 


Creatinine


 0.9 mg/dL


(0.6-1.0) 


 


 





 


Estimated GFR


(Cockcroft-Gault) 60.2 


 


 


 





 


BUN/Creatinine Ratio 19 (6-20)    


 


Glucose Level


 165 mg/dL


(70-99) 


 


 





 


Calcium Level


 10.2 mg/dL


(8.5-10.1) 


 


 





 


Magnesium Level


 1.8 mg/dL


(1.8-2.4) 


 


 





 


Total Bilirubin


 0.6 mg/dL


(0.2-1.0) 


 


 





 


Aspartate Amino Transf


(AST/SGOT) 16 U/L (15-37) 


 


 


 





 


Alanine Aminotransferase


(ALT/SGPT) 8 U/L (14-59) 


 


 


 





 


Alkaline Phosphatase


 82 U/L


() 


 


 





 


Creatine Kinase


 24 U/L


() 


 


 





 


Creatine Kinase MB (Mass)


 < 0.5 ng/mL


(0.0-3.6) 


 


 





 


Creatine Kinase MB Relative


Index  % (0-4) 


 


 


 





 


Troponin I Quantitative


 < 0.017 ng/mL


(0.000-0.055) 


 


 





 


Total Protein


 6.1 g/dL


(6.4-8.2) 


 


 





 


Albumin


 2.1 g/dL


(3.4-5.0) 


 


 





 


Albumin/Globulin Ratio 0.5 (1.0-1.7)    


 


Lipase


 37 U/L


() 


 


 





 


Urine Collection Type  Unknown   


 


Urine Color  Yellow   


 


Urine Clarity  Clear   


 


Urine pH  6.0 (<5.0-8.0)   


 


Urine Specific Gravity


 


 1.020


(1.000-1.030) 


 





 


Urine Protein


 


 Negative mg/dL


(NEG-TRACE) 


 





 


Urine Glucose (UA)


 


 Negative mg/dL


(NEG) 


 





 


Urine Ketones (Stick)


 


 Negative mg/dL


(NEG) 


 





 


Urine Blood  Moderate (NEG)   


 


Urine Nitrite  Negative (NEG)   


 


Urine Bilirubin  Negative (NEG)   


 


Urine Urobilinogen Dipstick


 


 1.0 mg/dL (0.2


mg/dL) 


 





 


Urine Leukocyte Esterase  Moderate (NEG)   


 


Urine RBC


 


 20-40 /HPF


(0-2) 


 





 


Urine WBC


 


 11-20 /HPF


(0-4) 


 





 


Urine Squamous Epithelial


Cells 


 Mod /LPF 


 


 





 


Urine Bacteria


 


 Moderate /HPF


(0-FEW) 


 





 


Urine Mucus  Mod /LPF   


 


Urine Yeast  Present /HPF   


 


Lactic Acid Level


 


 


 1.6 mmol/L


(0.4-2.0) 0.9 mmol/L


(0.4-2.0)


 


Prothrombin Time


 


 


 


 > 120.0 SEC


(11.7-14.0)


 


Prothromb Time International


Ratio 


 


 


 > 15.0


(0.8-1.1)


 


Activated Partial


Thromboplast Time 


 


 


 94 SEC (24-38) 





 


Test


 5/19/20


08:15 5/19/20


12:15 5/19/20


14:47 





 


Glucose (Fingerstick)


 188 mg/dL


(70-99) 


 


 





 


White Blood Count


 


 11.0 x10^3/uL


(4.0-11.0) 


 





 


Red Blood Count


 


 3.37 x10^6/uL


(3.50-5.40) 


 





 


Hemoglobin


 


 9.7 g/dL


(12.0-15.5) 


 





 


Hematocrit


 


 29.5 %


(36.0-47.0) 


 





 


Mean Corpuscular Volume  88 fL ()   


 


Mean Corpuscular Hemoglobin  29 pg (25-35)   


 


Mean Corpuscular Hemoglobin


Concent 


 33 g/dL


(31-37) 


 





 


Red Cell Distribution Width


 


 20.8 %


(11.5-14.5) 


 





 


Platelet Count


 


 231 x10^3/uL


(140-400) 


 





 


Neutrophils (%) (Auto)  88 % (31-73)   


 


Lymphocytes (%) (Auto)  3 % (24-48)   


 


Monocytes (%) (Auto)  8 % (0-9)   


 


Eosinophils (%) (Auto)  0 % (0-3)   


 


Basophils (%) (Auto)  0 % (0-3)   


 


Neutrophils # (Auto)


 


 9.7 x10^3/uL


(1.8-7.7) 


 





 


Lymphocytes # (Auto)


 


 0.4 x10^3/uL


(1.0-4.8) 


 





 


Monocytes # (Auto)


 


 0.9 x10^3/uL


(0.0-1.1) 


 





 


Eosinophils # (Auto)


 


 0.0 x10^3/uL


(0.0-0.7) 


 





 


Basophils # (Auto)


 


 0.0 x10^3/uL


(0.0-0.2) 


 





 


Prothrombin Time


 


 26.2 SEC


(11.7-14.0) 22.3 SEC


(11.7-14.0) 





 


Prothromb Time International


Ratio 


 2.4 (0.8-1.1) 


 2.0 (0.8-1.1) 


 











Laboratory Tests








Test


 5/19/20


05:35 5/19/20


06:05 5/19/20


06:15 5/19/20


07:50


 


White Blood Count


 12.6 x10^3/uL


(4.0-11.0) 


 


 





 


Red Blood Count


 3.79 x10^6/uL


(3.50-5.40) 


 


 





 


Hemoglobin


 10.7 g/dL


(12.0-15.5) 


 


 





 


Hematocrit


 32.7 %


(36.0-47.0) 


 


 





 


Mean Corpuscular Volume 86 fL ()    


 


Mean Corpuscular Hemoglobin 28 pg (25-35)    


 


Mean Corpuscular Hemoglobin


Concent 33 g/dL


(31-37) 


 


 





 


Red Cell Distribution Width


 20.1 %


(11.5-14.5) 


 


 





 


Platelet Count


 299 x10^3/uL


(140-400) 


 


 





 


Neutrophils (%) (Auto) 85 % (31-73)    


 


Lymphocytes (%) (Auto) 6 % (24-48)    


 


Monocytes (%) (Auto) 8 % (0-9)    


 


Eosinophils (%) (Auto) 1 % (0-3)    


 


Basophils (%) (Auto) 0 % (0-3)    


 


Neutrophils # (Auto)


 10.8 x10^3/uL


(1.8-7.7) 


 


 





 


Lymphocytes # (Auto)


 0.8 x10^3/uL


(1.0-4.8) 


 


 





 


Monocytes # (Auto)


 1.0 x10^3/uL


(0.0-1.1) 


 


 





 


Eosinophils # (Auto)


 0.1 x10^3/uL


(0.0-0.7) 


 


 





 


Basophils # (Auto)


 0.0 x10^3/uL


(0.0-0.2) 


 


 





 


Segmented Neutrophils % 89 % (35-66)    


 


Lymphocytes % 3 % (24-48)    


 


Monocytes % 8 % (0-10)    


 


Platelet Estimate


 Adequate


(ADEQUATE) 


 


 





 


Polychromasia Slight    


 


Anisocytosis Mod    


 


Tear Drop Cells Occ    


 


Sodium Level


 137 mmol/L


(136-145) 


 


 





 


Potassium Level


 4.1 mmol/L


(3.5-5.1) 


 


 





 


Chloride Level


 100 mmol/L


() 


 


 





 


Carbon Dioxide Level


 34 mmol/L


(21-32) 


 


 





 


Anion Gap 3 (6-14)    


 


Blood Urea Nitrogen


 17 mg/dL


(7-20) 


 


 





 


Creatinine


 0.9 mg/dL


(0.6-1.0) 


 


 





 


Estimated GFR


(Cockcroft-Gault) 60.2 


 


 


 





 


BUN/Creatinine Ratio 19 (6-20)    


 


Glucose Level


 165 mg/dL


(70-99) 


 


 





 


Calcium Level


 10.2 mg/dL


(8.5-10.1) 


 


 





 


Magnesium Level


 1.8 mg/dL


(1.8-2.4) 


 


 





 


Total Bilirubin


 0.6 mg/dL


(0.2-1.0) 


 


 





 


Aspartate Amino Transf


(AST/SGOT) 16 U/L (15-37) 


 


 


 





 


Alanine Aminotransferase


(ALT/SGPT) 8 U/L (14-59) 


 


 


 





 


Alkaline Phosphatase


 82 U/L


() 


 


 





 


Creatine Kinase


 24 U/L


() 


 


 





 


Creatine Kinase MB (Mass)


 < 0.5 ng/mL


(0.0-3.6) 


 


 





 


Creatine Kinase MB Relative


Index  % (0-4) 


 


 


 





 


Troponin I Quantitative


 < 0.017 ng/mL


(0.000-0.055) 


 


 





 


Total Protein


 6.1 g/dL


(6.4-8.2) 


 


 





 


Albumin


 2.1 g/dL


(3.4-5.0) 


 


 





 


Albumin/Globulin Ratio 0.5 (1.0-1.7)    


 


Lipase


 37 U/L


() 


 


 





 


Urine Collection Type  Unknown   


 


Urine Color  Yellow   


 


Urine Clarity  Clear   


 


Urine pH  6.0 (<5.0-8.0)   


 


Urine Specific Gravity


 


 1.020


(1.000-1.030) 


 





 


Urine Protein


 


 Negative mg/dL


(NEG-TRACE) 


 





 


Urine Glucose (UA)


 


 Negative mg/dL


(NEG) 


 





 


Urine Ketones (Stick)


 


 Negative mg/dL


(NEG) 


 





 


Urine Blood  Moderate (NEG)   


 


Urine Nitrite  Negative (NEG)   


 


Urine Bilirubin  Negative (NEG)   


 


Urine Urobilinogen Dipstick


 


 1.0 mg/dL (0.2


mg/dL) 


 





 


Urine Leukocyte Esterase  Moderate (NEG)   


 


Urine RBC


 


 20-40 /HPF


(0-2) 


 





 


Urine WBC


 


 11-20 /HPF


(0-4) 


 





 


Urine Squamous Epithelial


Cells 


 Mod /LPF 


 


 





 


Urine Bacteria


 


 Moderate /HPF


(0-FEW) 


 





 


Urine Mucus  Mod /LPF   


 


Urine Yeast  Present /HPF   


 


Lactic Acid Level


 


 


 1.6 mmol/L


(0.4-2.0) 0.9 mmol/L


(0.4-2.0)


 


Prothrombin Time


 


 


 


 > 120.0 SEC


(11.7-14.0)


 


Prothromb Time International


Ratio 


 


 


 > 15.0


(0.8-1.1)


 


Activated Partial


Thromboplast Time 


 


 


 94 SEC (24-38) 





 


Test


 5/19/20


08:15 5/19/20


12:15 5/19/20


14:47 





 


Glucose (Fingerstick)


 188 mg/dL


(70-99) 


 


 





 


White Blood Count


 


 11.0 x10^3/uL


(4.0-11.0) 


 





 


Red Blood Count


 


 3.37 x10^6/uL


(3.50-5.40) 


 





 


Hemoglobin


 


 9.7 g/dL


(12.0-15.5) 


 





 


Hematocrit


 


 29.5 %


(36.0-47.0) 


 





 


Mean Corpuscular Volume  88 fL ()   


 


Mean Corpuscular Hemoglobin  29 pg (25-35)   


 


Mean Corpuscular Hemoglobin


Concent 


 33 g/dL


(31-37) 


 





 


Red Cell Distribution Width


 


 20.8 %


(11.5-14.5) 


 





 


Platelet Count


 


 231 x10^3/uL


(140-400) 


 





 


Neutrophils (%) (Auto)  88 % (31-73)   


 


Lymphocytes (%) (Auto)  3 % (24-48)   


 


Monocytes (%) (Auto)  8 % (0-9)   


 


Eosinophils (%) (Auto)  0 % (0-3)   


 


Basophils (%) (Auto)  0 % (0-3)   


 


Neutrophils # (Auto)


 


 9.7 x10^3/uL


(1.8-7.7) 


 





 


Lymphocytes # (Auto)


 


 0.4 x10^3/uL


(1.0-4.8) 


 





 


Monocytes # (Auto)


 


 0.9 x10^3/uL


(0.0-1.1) 


 





 


Eosinophils # (Auto)


 


 0.0 x10^3/uL


(0.0-0.7) 


 





 


Basophils # (Auto)


 


 0.0 x10^3/uL


(0.0-0.2) 


 





 


Prothrombin Time


 


 26.2 SEC


(11.7-14.0) 22.3 SEC


(11.7-14.0) 





 


Prothromb Time International


Ratio 


 2.4 (0.8-1.1) 


 2.0 (0.8-1.1) 


 











Allergies





                                    Allergies








Coded Allergies Type Severity Reaction Last Updated Verified


 


  phenazopyridine Allergy Intermediate  5/24/16 Yes


 


  strawberry Allergy Intermediate hives 5/9/20 Yes








Disposition/Orders:  Other (TRANFER TO [facility] BY AMBULANCE ASAP)











FULBRFRANCISCO,ERUM W MD          May 19, 2020 21:23

## 2020-05-19 NOTE — NUR
Discharge Note:



PRIYA CHAVIRA



Discharge instructions and discharge home medications reviewed with Other facility and a 
copy given. All questions have been answered and understanding verbalized. 



The following instructions and handouts were given: Nursing report given to Melony BERNAL RN.  
Labs, medication, H/P was discussed.



Discontinued lines and drains: Lines stayed intact for transport.



Patient discharged to [facility]. Patient was in need of a urologist. [facility] drove patient 
from [facility] facility.

## 2020-05-19 NOTE — NUR
SS following up with discharge planning. Pt transferring to  for Peritoneal Abscess. 
Transfer request made to  by RN. Pt accepted. Bed assignment [id number]. Report# [phone]. 
Accepting physician Dr. González Ji. Images clouded to . Pt will discharge and go to  
via [facility] ambulance, [phone]. Packet, ambulance form, and transfer form on chart.

## 2020-05-19 NOTE — PDOC2
CONSULT


Date of Consult


Date of Consult


DATE: 5/19/20 


TIME: 12:53





Reason for Consult


Reason for Consult:


retroperitoneal hematoma





Referring Physician


Referring Physician:


Dr. Lewis





Identification/Chief Complaint


Chief Complaint


left flank pain





Source


Source:  Chart review, Patient





History of Present Illness


Reason for Visit:


80 yo F with c/o left flank pain.  No recent trauma.  Some increased difficulty 

breathing.  Seen in ICU.





Past Medical History


Cardiovascular:  AFIB, HTN


Pulmonary:  COPD


Endocrine:  Diabetes





Past Surgical History


Past Surgical History:  No pertinent history





Family History


Family History:  Hypertension





Social History


Quit


ALCOHOL:  none


Drugs:  None





Current Problem List


Problem List


Problems


Medical Problems:


(1) Retroperitoneal abscess


Status: Acute  





(2) Retroperitoneal hematoma


Status: Acute  





(3) Supratherapeutic INR


Status: Acute  











Current Medications


Current Medications





Current Medications


Ondansetron HCl (Zofran) 4 mg 1X  ONCE IVP  Last administered on 5/19/20at 

06:18;  Start 5/19/20 at 06:00;  Stop 5/19/20 at 06:01;  Status DC


Famotidine (Pepcid Vial) 20 mg 1X  ONCE IVP  Last administered on 5/19/20at 

06:18;  Start 5/19/20 at 06:00;  Stop 5/19/20 at 06:01;  Status DC


Sodium Chloride 1,000 ml @  1,000 mls/hr 1X  ONCE IV  Last administered on 

5/19/20at 06:19;  Start 5/19/20 at 06:00;  Stop 5/19/20 at 06:59;  Status DC


Iohexol (Omnipaque 300 Mg/ml) 75 ml 1X  ONCE IV  Last administered on 5/19/20at 

07:14;  Start 5/19/20 at 06:45;  Stop 5/19/20 at 06:46;  Status DC


Info (CONTRAST GIVEN -- Rx MONITORING) 1 each PRN DAILY  PRN MC SEE COMMENTS;  

Start 5/19/20 at 06:45;  Stop 5/21/20 at 06:44


Piperacillin Sod/ Tazobactam Sod 4.5 gm/Sodium Chloride 100 ml @  200 mls/hr 1X 

ONCE IV  Last administered on 5/19/20at 09:02;  Start 5/19/20 at 07:45;  Stop 

5/19/20 at 08:14;  Status DC


Vancomycin HCl 1.75 gm/Sodium Chloride 500 ml @  250 mls/hr 1X IV ;  Start 

5/19/20 at 07:41


Ondansetron HCl (Zofran) 4 mg 1X  ONCE IVP  Last administered on 5/19/20at 

08:08;  Start 5/19/20 at 07:45;  Stop 5/19/20 at 07:53;  Status DC


Phytonadione 10 mg/Dextrose 51 ml @  102 mls/hr 1X  ONCE IV  Last administered 

on 5/19/20at 09:41;  Start 5/19/20 at 09:15;  Stop 5/19/20 at 09:44;  Status DC


Metoclopramide HCl (Reglan Vial) 10 mg 1X  ONCE IVP  Last administered on 

5/19/20at 09:38;  Start 5/19/20 at 09:45;  Stop 5/19/20 at 09:46;  Status DC





Active Scripts


Active


Januvia (Sitagliptin Phosphate) 50 Mg Tablet 1 Tab PO DAILY


Reported


Alendronate Sodium 70 Mg Tablet 1 Tab PO WEEKLY


Diphenhydramine Hcl 25 Mg Tablet 25 Mg PO PRN Q6-8HRS PRN


Simvastatin 10 Mg Tablet 1 Tab PO QHS


Warfarin Sodium 6 Mg Tablet 6 Mg PO DAILY


Calcium 600 + Vit D3 Tablet (Calcium Carbonate/Vitamin D3) 1 Each Tablet 1 Tab 

PO DAILY 30 Days


Losartan Potassium 50 Mg Tablet 25 Mg PO DAILY


Furosemide 40 Mg Tablet 1 Tab PO DAILY


Klor-Con M10 (Potassium Chloride) 10 Meq Tab.er.prt 10 Meq PO DAILY





Allergies


Allergies:  


Coded Allergies:  


     phenazopyridine (Verified  Allergy, Intermediate, 5/24/16)


     strawberry (Verified  Allergy, Intermediate, hives, 5/9/20)





ROS


Gastrointestinal:  Yes Abdominal Pain





Physical Exam


General:  Alert, Cooperative, mild distress


HEENT:  Atraumatic


Lungs:  Normal air movement


Abdomen:  Soft, Other (no ecchymosis or masses, obese)





Vitals


VITALS





Vital Signs








  Date Time  Temp Pulse Resp B/P (MAP) Pulse Ox O2 Delivery O2 Flow Rate FiO2


 


5/19/20 12:21 98.4 71 16 117/62    





 98.4       


 


5/19/20 11:55      Nasal Cannula 2.0 


 


5/19/20 11:49     100   











Labs


Labs





Laboratory Tests








Test


 5/19/20


05:35 5/19/20


06:05 5/19/20


06:15 5/19/20


07:50


 


White Blood Count


 12.6 x10^3/uL


(4.0-11.0) 


 


 





 


Red Blood Count


 3.79 x10^6/uL


(3.50-5.40) 


 


 





 


Hemoglobin


 10.7 g/dL


(12.0-15.5) 


 


 





 


Hematocrit


 32.7 %


(36.0-47.0) 


 


 





 


Mean Corpuscular Volume 86 fL ()    


 


Mean Corpuscular Hemoglobin 28 pg (25-35)    


 


Mean Corpuscular Hemoglobin


Concent 33 g/dL


(31-37) 


 


 





 


Red Cell Distribution Width


 20.1 %


(11.5-14.5) 


 


 





 


Platelet Count


 299 x10^3/uL


(140-400) 


 


 





 


Neutrophils (%) (Auto) 85 % (31-73)    


 


Lymphocytes (%) (Auto) 6 % (24-48)    


 


Monocytes (%) (Auto) 8 % (0-9)    


 


Eosinophils (%) (Auto) 1 % (0-3)    


 


Basophils (%) (Auto) 0 % (0-3)    


 


Neutrophils # (Auto)


 10.8 x10^3/uL


(1.8-7.7) 


 


 





 


Lymphocytes # (Auto)


 0.8 x10^3/uL


(1.0-4.8) 


 


 





 


Monocytes # (Auto)


 1.0 x10^3/uL


(0.0-1.1) 


 


 





 


Eosinophils # (Auto)


 0.1 x10^3/uL


(0.0-0.7) 


 


 





 


Basophils # (Auto)


 0.0 x10^3/uL


(0.0-0.2) 


 


 





 


Segmented Neutrophils % 89 % (35-66)    


 


Lymphocytes % 3 % (24-48)    


 


Monocytes % 8 % (0-10)    


 


Platelet Estimate


 Adequate


(ADEQUATE) 


 


 





 


Polychromasia Slight    


 


Anisocytosis Mod    


 


Tear Drop Cells Occ    


 


Sodium Level


 137 mmol/L


(136-145) 


 


 





 


Potassium Level


 4.1 mmol/L


(3.5-5.1) 


 


 





 


Chloride Level


 100 mmol/L


() 


 


 





 


Carbon Dioxide Level


 34 mmol/L


(21-32) 


 


 





 


Anion Gap 3 (6-14)    


 


Blood Urea Nitrogen


 17 mg/dL


(7-20) 


 


 





 


Creatinine


 0.9 mg/dL


(0.6-1.0) 


 


 





 


Estimated GFR


(Cockcroft-Gault) 60.2 


 


 


 





 


BUN/Creatinine Ratio 19 (6-20)    


 


Glucose Level


 165 mg/dL


(70-99) 


 


 





 


Calcium Level


 10.2 mg/dL


(8.5-10.1) 


 


 





 


Magnesium Level


 1.8 mg/dL


(1.8-2.4) 


 


 





 


Total Bilirubin


 0.6 mg/dL


(0.2-1.0) 


 


 





 


Aspartate Amino Transf


(AST/SGOT) 16 U/L (15-37) 


 


 


 





 


Alanine Aminotransferase


(ALT/SGPT) 8 U/L (14-59) 


 


 


 





 


Alkaline Phosphatase


 82 U/L


() 


 


 





 


Creatine Kinase


 24 U/L


() 


 


 





 


Creatine Kinase MB (Mass)


 < 0.5 ng/mL


(0.0-3.6) 


 


 





 


Creatine Kinase MB Relative


Index  % (0-4) 


 


 


 





 


Troponin I Quantitative


 < 0.017 ng/mL


(0.000-0.055) 


 


 





 


Total Protein


 6.1 g/dL


(6.4-8.2) 


 


 





 


Albumin


 2.1 g/dL


(3.4-5.0) 


 


 





 


Albumin/Globulin Ratio 0.5 (1.0-1.7)    


 


Lipase


 37 U/L


() 


 


 





 


Urine Collection Type  Unknown   


 


Urine Color  Yellow   


 


Urine Clarity  Clear   


 


Urine pH  6.0 (<5.0-8.0)   


 


Urine Specific Gravity


 


 1.020


(1.000-1.030) 


 





 


Urine Protein


 


 Negative mg/dL


(NEG-TRACE) 


 





 


Urine Glucose (UA)


 


 Negative mg/dL


(NEG) 


 





 


Urine Ketones (Stick)


 


 Negative mg/dL


(NEG) 


 





 


Urine Blood  Moderate (NEG)   


 


Urine Nitrite  Negative (NEG)   


 


Urine Bilirubin  Negative (NEG)   


 


Urine Urobilinogen Dipstick


 


 1.0 mg/dL (0.2


mg/dL) 


 





 


Urine Leukocyte Esterase  Moderate (NEG)   


 


Urine RBC


 


 20-40 /HPF


(0-2) 


 





 


Urine WBC


 


 11-20 /HPF


(0-4) 


 





 


Urine Squamous Epithelial


Cells 


 Mod /LPF 


 


 





 


Urine Bacteria


 


 Moderate /HPF


(0-FEW) 


 





 


Urine Mucus  Mod /LPF   


 


Urine Yeast  Present /HPF   


 


Lactic Acid Level


 


 


 1.6 mmol/L


(0.4-2.0) 0.9 mmol/L


(0.4-2.0)


 


Prothrombin Time


 


 


 


 > 120.0 SEC


(11.7-14.0)


 


Prothromb Time International


Ratio 


 


 


 > 15.0


(0.8-1.1)


 


Activated Partial


Thromboplast Time 


 


 


 94 SEC (24-38) 





 


Test


 5/19/20


08:15 5/19/20


12:15 


 





 


Glucose (Fingerstick)


 188 mg/dL


(70-99) 


 


 





 


White Blood Count


 


 11.0 x10^3/uL


(4.0-11.0) 


 





 


Red Blood Count


 


 3.37 x10^6/uL


(3.50-5.40) 


 





 


Hemoglobin


 


 9.7 g/dL


(12.0-15.5) 


 





 


Hematocrit


 


 29.5 %


(36.0-47.0) 


 





 


Mean Corpuscular Volume  88 fL ()   


 


Mean Corpuscular Hemoglobin  29 pg (25-35)   


 


Mean Corpuscular Hemoglobin


Concent 


 33 g/dL


(31-37) 


 





 


Red Cell Distribution Width


 


 20.8 %


(11.5-14.5) 


 





 


Platelet Count


 


 231 x10^3/uL


(140-400) 


 





 


Neutrophils (%) (Auto)  88 % (31-73)   


 


Lymphocytes (%) (Auto)  3 % (24-48)   


 


Monocytes (%) (Auto)  8 % (0-9)   


 


Eosinophils (%) (Auto)  0 % (0-3)   


 


Basophils (%) (Auto)  0 % (0-3)   


 


Neutrophils # (Auto)


 


 9.7 x10^3/uL


(1.8-7.7) 


 





 


Lymphocytes # (Auto)


 


 0.4 x10^3/uL


(1.0-4.8) 


 





 


Monocytes # (Auto)


 


 0.9 x10^3/uL


(0.0-1.1) 


 





 


Eosinophils # (Auto)


 


 0.0 x10^3/uL


(0.0-0.7) 


 





 


Basophils # (Auto)


 


 0.0 x10^3/uL


(0.0-0.2) 


 





 


Prothrombin Time


 


 26.2 SEC


(11.7-14.0) 


 





 


Prothromb Time International


Ratio 


 2.4 (0.8-1.1) 


 


 











Laboratory Tests








Test


 5/19/20


05:35 5/19/20


06:05 5/19/20


06:15 5/19/20


07:50


 


White Blood Count


 12.6 x10^3/uL


(4.0-11.0) 


 


 





 


Red Blood Count


 3.79 x10^6/uL


(3.50-5.40) 


 


 





 


Hemoglobin


 10.7 g/dL


(12.0-15.5) 


 


 





 


Hematocrit


 32.7 %


(36.0-47.0) 


 


 





 


Mean Corpuscular Volume 86 fL ()    


 


Mean Corpuscular Hemoglobin 28 pg (25-35)    


 


Mean Corpuscular Hemoglobin


Concent 33 g/dL


(31-37) 


 


 





 


Red Cell Distribution Width


 20.1 %


(11.5-14.5) 


 


 





 


Platelet Count


 299 x10^3/uL


(140-400) 


 


 





 


Neutrophils (%) (Auto) 85 % (31-73)    


 


Lymphocytes (%) (Auto) 6 % (24-48)    


 


Monocytes (%) (Auto) 8 % (0-9)    


 


Eosinophils (%) (Auto) 1 % (0-3)    


 


Basophils (%) (Auto) 0 % (0-3)    


 


Neutrophils # (Auto)


 10.8 x10^3/uL


(1.8-7.7) 


 


 





 


Lymphocytes # (Auto)


 0.8 x10^3/uL


(1.0-4.8) 


 


 





 


Monocytes # (Auto)


 1.0 x10^3/uL


(0.0-1.1) 


 


 





 


Eosinophils # (Auto)


 0.1 x10^3/uL


(0.0-0.7) 


 


 





 


Basophils # (Auto)


 0.0 x10^3/uL


(0.0-0.2) 


 


 





 


Segmented Neutrophils % 89 % (35-66)    


 


Lymphocytes % 3 % (24-48)    


 


Monocytes % 8 % (0-10)    


 


Platelet Estimate


 Adequate


(ADEQUATE) 


 


 





 


Polychromasia Slight    


 


Anisocytosis Mod    


 


Tear Drop Cells Occ    


 


Sodium Level


 137 mmol/L


(136-145) 


 


 





 


Potassium Level


 4.1 mmol/L


(3.5-5.1) 


 


 





 


Chloride Level


 100 mmol/L


() 


 


 





 


Carbon Dioxide Level


 34 mmol/L


(21-32) 


 


 





 


Anion Gap 3 (6-14)    


 


Blood Urea Nitrogen


 17 mg/dL


(7-20) 


 


 





 


Creatinine


 0.9 mg/dL


(0.6-1.0) 


 


 





 


Estimated GFR


(Cockcroft-Gault) 60.2 


 


 


 





 


BUN/Creatinine Ratio 19 (6-20)    


 


Glucose Level


 165 mg/dL


(70-99) 


 


 





 


Calcium Level


 10.2 mg/dL


(8.5-10.1) 


 


 





 


Magnesium Level


 1.8 mg/dL


(1.8-2.4) 


 


 





 


Total Bilirubin


 0.6 mg/dL


(0.2-1.0) 


 


 





 


Aspartate Amino Transf


(AST/SGOT) 16 U/L (15-37) 


 


 


 





 


Alanine Aminotransferase


(ALT/SGPT) 8 U/L (14-59) 


 


 


 





 


Alkaline Phosphatase


 82 U/L


() 


 


 





 


Creatine Kinase


 24 U/L


() 


 


 





 


Creatine Kinase MB (Mass)


 < 0.5 ng/mL


(0.0-3.6) 


 


 





 


Creatine Kinase MB Relative


Index  % (0-4) 


 


 


 





 


Troponin I Quantitative


 < 0.017 ng/mL


(0.000-0.055) 


 


 





 


Total Protein


 6.1 g/dL


(6.4-8.2) 


 


 





 


Albumin


 2.1 g/dL


(3.4-5.0) 


 


 





 


Albumin/Globulin Ratio 0.5 (1.0-1.7)    


 


Lipase


 37 U/L


() 


 


 





 


Urine Collection Type  Unknown   


 


Urine Color  Yellow   


 


Urine Clarity  Clear   


 


Urine pH  6.0 (<5.0-8.0)   


 


Urine Specific Gravity


 


 1.020


(1.000-1.030) 


 





 


Urine Protein


 


 Negative mg/dL


(NEG-TRACE) 


 





 


Urine Glucose (UA)


 


 Negative mg/dL


(NEG) 


 





 


Urine Ketones (Stick)


 


 Negative mg/dL


(NEG) 


 





 


Urine Blood  Moderate (NEG)   


 


Urine Nitrite  Negative (NEG)   


 


Urine Bilirubin  Negative (NEG)   


 


Urine Urobilinogen Dipstick


 


 1.0 mg/dL (0.2


mg/dL) 


 





 


Urine Leukocyte Esterase  Moderate (NEG)   


 


Urine RBC


 


 20-40 /HPF


(0-2) 


 





 


Urine WBC


 


 11-20 /HPF


(0-4) 


 





 


Urine Squamous Epithelial


Cells 


 Mod /LPF 


 


 





 


Urine Bacteria


 


 Moderate /HPF


(0-FEW) 


 





 


Urine Mucus  Mod /LPF   


 


Urine Yeast  Present /HPF   


 


Lactic Acid Level


 


 


 1.6 mmol/L


(0.4-2.0) 0.9 mmol/L


(0.4-2.0)


 


Prothrombin Time


 


 


 


 > 120.0 SEC


(11.7-14.0)


 


Prothromb Time International


Ratio 


 


 


 > 15.0


(0.8-1.1)


 


Activated Partial


Thromboplast Time 


 


 


 94 SEC (24-38) 





 


Test


 5/19/20


08:15 5/19/20


12:15 


 





 


Glucose (Fingerstick)


 188 mg/dL


(70-99) 


 


 





 


White Blood Count


 


 11.0 x10^3/uL


(4.0-11.0) 


 





 


Red Blood Count


 


 3.37 x10^6/uL


(3.50-5.40) 


 





 


Hemoglobin


 


 9.7 g/dL


(12.0-15.5) 


 





 


Hematocrit


 


 29.5 %


(36.0-47.0) 


 





 


Mean Corpuscular Volume  88 fL ()   


 


Mean Corpuscular Hemoglobin  29 pg (25-35)   


 


Mean Corpuscular Hemoglobin


Concent 


 33 g/dL


(31-37) 


 





 


Red Cell Distribution Width


 


 20.8 %


(11.5-14.5) 


 





 


Platelet Count


 


 231 x10^3/uL


(140-400) 


 





 


Neutrophils (%) (Auto)  88 % (31-73)   


 


Lymphocytes (%) (Auto)  3 % (24-48)   


 


Monocytes (%) (Auto)  8 % (0-9)   


 


Eosinophils (%) (Auto)  0 % (0-3)   


 


Basophils (%) (Auto)  0 % (0-3)   


 


Neutrophils # (Auto)


 


 9.7 x10^3/uL


(1.8-7.7) 


 





 


Lymphocytes # (Auto)


 


 0.4 x10^3/uL


(1.0-4.8) 


 





 


Monocytes # (Auto)


 


 0.9 x10^3/uL


(0.0-1.1) 


 





 


Eosinophils # (Auto)


 


 0.0 x10^3/uL


(0.0-0.7) 


 





 


Basophils # (Auto)


 


 0.0 x10^3/uL


(0.0-0.2) 


 





 


Prothrombin Time


 


 26.2 SEC


(11.7-14.0) 


 





 


Prothromb Time International


Ratio 


 2.4 (0.8-1.1) 


 


 














Images


Images


left retroperitoneal hematoma, 10 th rib fracture





Assessment/Plan


Assessment/Plan


Retroperitoneal hematoma


agree with holding coumadin and correcting as needed


appears stable


no surgical plans.  Should resolve with conservative measures.





Thanks for consult!











JAYA SCOTT MD             May 19, 2020 12:56